# Patient Record
Sex: FEMALE | Race: WHITE | NOT HISPANIC OR LATINO | Employment: FULL TIME | URBAN - METROPOLITAN AREA
[De-identification: names, ages, dates, MRNs, and addresses within clinical notes are randomized per-mention and may not be internally consistent; named-entity substitution may affect disease eponyms.]

---

## 2023-08-11 ENCOUNTER — HOSPITAL ENCOUNTER (OUTPATIENT)
Dept: DATA CONVERSION | Facility: HOSPITAL | Age: 33
Discharge: HOME | End: 2023-08-11

## 2023-08-11 ENCOUNTER — HOSPITAL ENCOUNTER (OUTPATIENT)
Dept: DATA CONVERSION | Facility: HOSPITAL | Age: 33
End: 2023-08-11

## 2023-08-11 DIAGNOSIS — N64.4 MASTODYNIA: ICD-10-CM

## 2023-08-11 DIAGNOSIS — N64.89 OTHER SPECIFIED DISORDERS OF BREAST: ICD-10-CM

## 2023-11-15 ASSESSMENT — ENCOUNTER SYMPTOMS
CONSTITUTIONAL NEGATIVE: 1
CONSTIPATION: 0
DIARRHEA: 0
VOMITING: 0
NAUSEA: 0

## 2023-11-15 NOTE — PROGRESS NOTES
Subjective   Patient ID: Beatriz Lopez is a 32 y.o. female who presents for No chief complaint on file..    HPI pt comes in today c.o of pain under her right rib cage for the past few months.  Just recently saw her gyn for right breast pain for a year with neg mammo and US    Review of Systems   Constitutional: Negative.    Gastrointestinal:  Negative for constipation, diarrhea, nausea and vomiting.   Musculoskeletal:         See hpi       Objective   There were no vitals taken for this visit.    Physical Exam    Assessment/Plan   {Assess/PlanSmartLinks:18138}

## 2023-11-21 ENCOUNTER — APPOINTMENT (OUTPATIENT)
Dept: PRIMARY CARE | Facility: CLINIC | Age: 33
End: 2023-11-21
Payer: COMMERCIAL

## 2023-11-27 ENCOUNTER — OFFICE VISIT (OUTPATIENT)
Dept: SURGERY | Facility: CLINIC | Age: 33
End: 2023-11-27
Payer: COMMERCIAL

## 2023-11-27 VITALS
RESPIRATION RATE: 17 BRPM | BODY MASS INDEX: 30.3 KG/M2 | HEIGHT: 63 IN | TEMPERATURE: 96.8 F | DIASTOLIC BLOOD PRESSURE: 78 MMHG | WEIGHT: 171 LBS | HEART RATE: 76 BPM | SYSTOLIC BLOOD PRESSURE: 118 MMHG

## 2023-11-27 DIAGNOSIS — K82.4 GALLBLADDER POLYP: Primary | ICD-10-CM

## 2023-11-27 PROCEDURE — 99212 OFFICE O/P EST SF 10 MIN: CPT | Performed by: SURGERY

## 2023-11-27 PROCEDURE — 99202 OFFICE O/P NEW SF 15 MIN: CPT | Performed by: SURGERY

## 2023-11-27 PROCEDURE — 1036F TOBACCO NON-USER: CPT | Performed by: SURGERY

## 2023-11-27 RX ORDER — CEFAZOLIN SODIUM 2 G/100ML
2 INJECTION, SOLUTION INTRAVENOUS ONCE
Status: CANCELLED | OUTPATIENT
Start: 2023-12-19 | End: 2023-11-27

## 2023-11-27 RX ORDER — SODIUM CHLORIDE, SODIUM LACTATE, POTASSIUM CHLORIDE, CALCIUM CHLORIDE 600; 310; 30; 20 MG/100ML; MG/100ML; MG/100ML; MG/100ML
100 INJECTION, SOLUTION INTRAVENOUS CONTINUOUS
Status: CANCELLED | OUTPATIENT
Start: 2023-12-19

## 2023-11-27 RX ORDER — LINACLOTIDE 72 UG/1
72 CAPSULE, GELATIN COATED ORAL
COMMUNITY

## 2023-11-27 RX ORDER — CELECOXIB 50 MG/1
400 CAPSULE ORAL ONCE
Status: CANCELLED | OUTPATIENT
Start: 2023-11-27 | End: 2023-11-27

## 2023-11-27 RX ORDER — FAMOTIDINE 10 MG/1
10 TABLET ORAL DAILY PRN
COMMUNITY

## 2023-11-27 RX ORDER — ACETAMINOPHEN 325 MG/1
975 TABLET ORAL ONCE
Status: CANCELLED | OUTPATIENT
Start: 2023-11-27 | End: 2023-11-27

## 2023-11-27 ASSESSMENT — PAIN SCALES - GENERAL: PAINLEVEL: 3

## 2023-11-27 ASSESSMENT — PATIENT HEALTH QUESTIONNAIRE - PHQ9
1. LITTLE INTEREST OR PLEASURE IN DOING THINGS: NOT AT ALL
SUM OF ALL RESPONSES TO PHQ9 QUESTIONS 1 & 2: 0
2. FEELING DOWN, DEPRESSED OR HOPELESS: NOT AT ALL

## 2023-11-27 ASSESSMENT — ENCOUNTER SYMPTOMS
ALLERGIC/IMMUNOLOGIC NEGATIVE: 1
CARDIOVASCULAR NEGATIVE: 1
OCCASIONAL FEELINGS OF UNSTEADINESS: 0
ENDOCRINE NEGATIVE: 1
ABDOMINAL PAIN: 1
NAUSEA: 1
EYES NEGATIVE: 1
RESPIRATORY NEGATIVE: 1
LOSS OF SENSATION IN FEET: 0
CONSTITUTIONAL NEGATIVE: 1
NEUROLOGICAL NEGATIVE: 1
HEMATOLOGIC/LYMPHATIC NEGATIVE: 1
MUSCULOSKELETAL NEGATIVE: 1
ABDOMINAL DISTENTION: 1
PSYCHIATRIC NEGATIVE: 1
DEPRESSION: 0

## 2023-11-27 ASSESSMENT — LIFESTYLE VARIABLES
AUDIT-C TOTAL SCORE: 1
SKIP TO QUESTIONS 9-10: 1
HOW MANY STANDARD DRINKS CONTAINING ALCOHOL DO YOU HAVE ON A TYPICAL DAY: 1 OR 2
HOW OFTEN DO YOU HAVE SIX OR MORE DRINKS ON ONE OCCASION: NEVER
HOW OFTEN DO YOU HAVE A DRINK CONTAINING ALCOHOL: MONTHLY OR LESS

## 2023-11-27 NOTE — H&P (VIEW-ONLY)
General Surgery Service    History Of Present Illness    Beatriz Lopez is a 33 y.o. female presenting with gallbladder polyps (3mm and 2 mm). Pain in epigastric region and right upper quadrant pain 30 minutes after eating. This has been worsening over the past 2 weeks. The patient also reports an associated nausea/vomiting with the symptoms.     She has attempted to change her diet to limit fatty foods without improvement in the symptoms.     No cardiac or pulmonary issues. No blood thinners/ anti-platelet agent use.     Past Medical History  She has a past medical history of Antiphospholipid antibody with hypercoagulable state (CMS/[facility]), BPPV (benign paroxysmal positional vertigo), History of COVID-19 (2022), History of depression, History of HPV infection, Migraine, Protein S deficiency (CMS/[facility]), Seasonal allergies, and Vitamin D deficiency.    No current outpatient medications on file prior to visit.     No current facility-administered medications on file prior to visit.         Surgical History  She has a past surgical history that includes MR angio head wo IV contrast (2021); MR angio neck wo IV contrast (2021); Tonsillectomy; and  section, classic.     Social History  She has no history on file for tobacco use, alcohol use, and drug use.    Family History  No family history on file.     Allergies  Patient has no allergy information on record.    Review of Systems   Constitutional: Negative.    HENT: Negative.     Eyes: Negative.    Respiratory: Negative.     Cardiovascular: Negative.    Gastrointestinal:  Positive for abdominal distention, abdominal pain and nausea.   Endocrine: Negative.    Genitourinary: Negative.    Musculoskeletal: Negative.    Skin: Negative.    Allergic/Immunologic: Negative.    Neurological: Negative.    Hematological: Negative.    Psychiatric/Behavioral: Negative.          Physical Exam  Constitutional:       Appearance: Normal appearance.   HENT:       "Head: Normocephalic.      Nose: Nose normal.      Mouth/Throat:      Mouth: Mucous membranes are dry.   Eyes:      Extraocular Movements: Extraocular movements intact.      Pupils: Pupils are equal, round, and reactive to light.   Cardiovascular:      Rate and Rhythm: Normal rate and regular rhythm.   Pulmonary:      Breath sounds: Normal breath sounds.   Abdominal:      General: Abdomen is flat.      Palpations: Abdomen is soft. There is mass.      Tenderness: There is abdominal tenderness.      Comments: TTP in the epigastric and RUQ   Musculoskeletal:         General: Normal range of motion.      Cervical back: Normal range of motion.   Skin:     General: Skin is warm and dry.   Neurological:      Mental Status: She is alert and oriented to person, place, and time.   Psychiatric:         Mood and Affect: Mood normal.         Behavior: Behavior normal.          Last Recorded Vitals  There were no vitals taken for this visit.    Relevant Results      No results found for this or any previous visit (from the past 24 hour(s)).   No results found for: \"HGBA1C\"   US ABD RIGHT UPPER QUADRANT    Result Date: 11/17/2023  * * *Final Report* * * DATE OF EXAM: Nov 17 2023 12:22PM   [facility] [place]   [address]  -  US ABD RIGHT UPPER QUADRANT  / ACCESSION #  [id number] PROCEDURE REASON: Other (enter below)      * * * * Physician Interpretation * * * *  EXAMINATION:   RIGHT UPPER QUADRANT ULTRASOUND CLINICAL HISTORY: RUQ pain TECHNIQUE:  Sonography of the right upper quadrant  was performed.   Images were obtained and stored in a permanent archive. MQ:  URUQ_2 COMPARISON: None. RESULT: Pancreas: Normal sonographic appearance.    Portions obscured: tail Liver:      Echotexture:  Normal, homogeneous.      Echogenicity:  Normal      Surface contour:  Smooth      Lesions:  None. Biliary: No intrahepatic biliary duct dilation.      CBD: 0.3 cm at the hilum.      Gallbladder: Normal caliber           -Contents:  No cholelithiasis. 3 mm and 2 mm " gallbladder wall polyps.           -Wall:  Normal           -Other:  No pericholecystic fluid.  Negative sonographic Mcmanus's sign. Right Kidney: No hydronephrosis. Ascites: None.    IMPRESSION: No sonographic evidence of acute cholecystitis. Normal sonographic appearance of the liver. A couple subcentimeter gallbladder polyps. : SABRINA   Transcribe Date/Time: Nov 17 2023 12:27P Dictated by : SURINDER ALCANTARA MD This examination was interpreted and the report reviewed and electronically signed by: SURINDER ALCANTARA MD on Nov 17 2023 12:34PM  EST      Active Problems:  There are no active Hospital Problems.     Assessment/Plan   Diagnoses and all orders for this visit:  Gallbladder polyp    Plan for laparoscopic cholecystectomy. Discussed risks including the risk of bleeding, infection, injury to surrounding structures, chronic diarrhea, bile leak. Patient provides consent.     Also discussed that GERD may improve or worsen or stay the same following surgery.         Laurence Man MD

## 2023-11-27 NOTE — PROGRESS NOTES
General Surgery Service    History Of Present Illness    Beatriz Lopez is a 33 y.o. female presenting with gallbladder polyps (3mm and 2 mm). Pain in epigastric region and right upper quadrant pain 30 minutes after eating. This has been worsening over the past 2 weeks. The patient also reports an associated nausea/vomiting with the symptoms.     She has attempted to change her diet to limit fatty foods without improvement in the symptoms.     No cardiac or pulmonary issues. No blood thinners/ anti-platelet agent use.     Past Medical History  She has a past medical history of Antiphospholipid antibody with hypercoagulable state (CMS/[facility]), BPPV (benign paroxysmal positional vertigo), History of COVID-19 (2022), History of depression, History of HPV infection, Migraine, Protein S deficiency (CMS/[facility]), Seasonal allergies, and Vitamin D deficiency.    No current outpatient medications on file prior to visit.     No current facility-administered medications on file prior to visit.         Surgical History  She has a past surgical history that includes MR angio head wo IV contrast (2021); MR angio neck wo IV contrast (2021); Tonsillectomy; and  section, classic.     Social History  She has no history on file for tobacco use, alcohol use, and drug use.    Family History  No family history on file.     Allergies  Patient has no allergy information on record.    Review of Systems   Constitutional: Negative.    HENT: Negative.     Eyes: Negative.    Respiratory: Negative.     Cardiovascular: Negative.    Gastrointestinal:  Positive for abdominal distention, abdominal pain and nausea.   Endocrine: Negative.    Genitourinary: Negative.    Musculoskeletal: Negative.    Skin: Negative.    Allergic/Immunologic: Negative.    Neurological: Negative.    Hematological: Negative.    Psychiatric/Behavioral: Negative.          Physical Exam  Constitutional:       Appearance: Normal appearance.   HENT:       "Head: Normocephalic.      Nose: Nose normal.      Mouth/Throat:      Mouth: Mucous membranes are dry.   Eyes:      Extraocular Movements: Extraocular movements intact.      Pupils: Pupils are equal, round, and reactive to light.   Cardiovascular:      Rate and Rhythm: Normal rate and regular rhythm.   Pulmonary:      Breath sounds: Normal breath sounds.   Abdominal:      General: Abdomen is flat.      Palpations: Abdomen is soft. There is mass.      Tenderness: There is abdominal tenderness.      Comments: TTP in the epigastric and RUQ   Musculoskeletal:         General: Normal range of motion.      Cervical back: Normal range of motion.   Skin:     General: Skin is warm and dry.   Neurological:      Mental Status: She is alert and oriented to person, place, and time.   Psychiatric:         Mood and Affect: Mood normal.         Behavior: Behavior normal.          Last Recorded Vitals  There were no vitals taken for this visit.    Relevant Results      No results found for this or any previous visit (from the past 24 hour(s)).   No results found for: \"HGBA1C\"   US ABD RIGHT UPPER QUADRANT    Result Date: 11/17/2023  * * *Final Report* * * DATE OF EXAM: Nov 17 2023 12:22PM   [facility] [place]   1032  -  US ABD RIGHT UPPER QUADRANT  / ACCESSION #  [id number] PROCEDURE REASON: Other (enter below)      * * * * Physician Interpretation * * * *  EXAMINATION:   RIGHT UPPER QUADRANT ULTRASOUND CLINICAL HISTORY: RUQ pain TECHNIQUE:  Sonography of the right upper quadrant  was performed.   Images were obtained and stored in a permanent archive. MQ:  URUQ_2 COMPARISON: None. RESULT: Pancreas: Normal sonographic appearance.    Portions obscured: tail Liver:      Echotexture:  Normal, homogeneous.      Echogenicity:  Normal      Surface contour:  Smooth      Lesions:  None. Biliary: No intrahepatic biliary duct dilation.      CBD: 0.3 cm at the hilum.      Gallbladder: Normal caliber           -Contents:  No cholelithiasis. 3 mm and 2 mm " gallbladder wall polyps.           -Wall:  Normal           -Other:  No pericholecystic fluid.  Negative sonographic Mcmanus's sign. Right Kidney: No hydronephrosis. Ascites: None.    IMPRESSION: No sonographic evidence of acute cholecystitis. Normal sonographic appearance of the liver. A couple subcentimeter gallbladder polyps. : SABRINA   Transcribe Date/Time: Nov 17 2023 12:27P Dictated by : SURINDER ALCANTARA MD This examination was interpreted and the report reviewed and electronically signed by: SURINDER ALCANTARA MD on Nov 17 2023 12:34PM  EST      Active Problems:  There are no active Hospital Problems.     Assessment/Plan   Diagnoses and all orders for this visit:  Gallbladder polyp    Plan for laparoscopic cholecystectomy. Discussed risks including the risk of bleeding, infection, injury to surrounding structures, chronic diarrhea, bile leak. Patient provides consent.     Also discussed that GERD may improve or worsen or stay the same following surgery.         Laurence Man MD

## 2023-11-30 ENCOUNTER — TELEPHONE (OUTPATIENT)
Dept: SURGERY | Facility: CLINIC | Age: 33
End: 2023-11-30
Payer: COMMERCIAL

## 2023-12-18 ENCOUNTER — ANESTHESIA EVENT (OUTPATIENT)
Dept: OPERATING ROOM | Facility: HOSPITAL | Age: 33
End: 2023-12-18
Payer: COMMERCIAL

## 2023-12-18 SDOH — HEALTH STABILITY: MENTAL HEALTH: CURRENT SMOKER: 0

## 2023-12-19 ENCOUNTER — PHARMACY VISIT (OUTPATIENT)
Dept: PHARMACY | Facility: CLINIC | Age: 33
End: 2023-12-19
Payer: MEDICARE

## 2023-12-19 ENCOUNTER — ANESTHESIA (OUTPATIENT)
Dept: OPERATING ROOM | Facility: HOSPITAL | Age: 33
End: 2023-12-19
Payer: COMMERCIAL

## 2023-12-19 ENCOUNTER — HOSPITAL ENCOUNTER (OUTPATIENT)
Facility: HOSPITAL | Age: 33
Setting detail: OUTPATIENT SURGERY
Discharge: HOME | End: 2023-12-19
Attending: SURGERY | Admitting: SURGERY
Payer: COMMERCIAL

## 2023-12-19 VITALS
SYSTOLIC BLOOD PRESSURE: 111 MMHG | RESPIRATION RATE: 17 BRPM | DIASTOLIC BLOOD PRESSURE: 62 MMHG | HEIGHT: 63 IN | TEMPERATURE: 97.2 F | WEIGHT: 170 LBS | BODY MASS INDEX: 30.12 KG/M2 | HEART RATE: 64 BPM | OXYGEN SATURATION: 100 %

## 2023-12-19 DIAGNOSIS — K82.4 GALLBLADDER POLYP: Primary | ICD-10-CM

## 2023-12-19 PROBLEM — R42 LIGHTHEADEDNESS: Status: ACTIVE | Noted: 2023-12-19

## 2023-12-19 PROBLEM — U07.1 COVID-19: Status: ACTIVE | Noted: 2023-12-19

## 2023-12-19 PROBLEM — O09.299 PREGNANCY WITH POOR OBSTETRIC HISTORY (HHS-HCC): Status: ACTIVE | Noted: 2023-12-19

## 2023-12-19 PROBLEM — J30.2 SEASONAL ALLERGIES: Status: ACTIVE | Noted: 2023-12-19

## 2023-12-19 PROBLEM — R21 RASH: Status: ACTIVE | Noted: 2023-12-19

## 2023-12-19 PROBLEM — F41.9 ANXIETY: Status: ACTIVE | Noted: 2023-12-19

## 2023-12-19 PROBLEM — R07.89 CHEST PAIN, ATYPICAL: Status: ACTIVE | Noted: 2023-12-19

## 2023-12-19 PROBLEM — O21.9 VOMITING OF PREGNANCY (HHS-HCC): Status: ACTIVE | Noted: 2023-12-19

## 2023-12-19 PROBLEM — R51.9 HEADACHE: Status: ACTIVE | Noted: 2023-12-19

## 2023-12-19 PROBLEM — M54.2 NECK PAIN: Status: ACTIVE | Noted: 2023-12-19

## 2023-12-19 PROBLEM — R55 NEAR SYNCOPE: Status: ACTIVE | Noted: 2023-12-19

## 2023-12-19 PROBLEM — F43.10 PTSD (POST-TRAUMATIC STRESS DISORDER): Status: ACTIVE | Noted: 2023-12-19

## 2023-12-19 PROBLEM — R87.811 VAGINAL HIGH RISK HUMAN PAPILLOMAVIRUS (HPV) DNA TEST POSITIVE: Status: ACTIVE | Noted: 2023-12-19
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
## 2023-12-19 PROBLEM — R10.9 ABDOMINAL PAIN: Status: ACTIVE | Noted: 2023-12-19

## 2023-12-19 PROBLEM — E55.9 VITAMIN D DEFICIENCY: Status: ACTIVE | Noted: 2023-12-19

## 2023-12-19 PROBLEM — D68.59 THROMBOPHILIA (MULTI): Status: ACTIVE | Noted: 2023-12-19

## 2023-12-19 PROBLEM — H93.293 ABNORMAL AUDITORY PERCEPTION OF BOTH EARS: Status: ACTIVE | Noted: 2023-12-19

## 2023-12-19 PROBLEM — F32.A DEPRESSION: Status: ACTIVE | Noted: 2023-12-19

## 2023-12-19 PROBLEM — R10.9 STOMACH CRAMPS: Status: ACTIVE | Noted: 2023-12-19

## 2023-12-19 PROBLEM — R42 DIZZINESS: Status: ACTIVE | Noted: 2023-12-19

## 2023-12-19 PROBLEM — Z98.891 HISTORY OF CESAREAN SECTION: Status: ACTIVE | Noted: 2023-12-19

## 2023-12-19 PROBLEM — N93.9 ABNORMAL VAGINAL BLEEDING: Status: ACTIVE | Noted: 2023-12-19

## 2023-12-19 PROBLEM — F41.8 MIXED ANXIETY AND DEPRESSIVE DISORDER: Status: ACTIVE | Noted: 2023-12-19

## 2023-12-19 PROBLEM — H81.12 BENIGN PAROXYSMAL POSITIONAL VERTIGO OF LEFT EAR: Status: ACTIVE | Noted: 2023-12-19

## 2023-12-19 PROBLEM — G43.909 MIGRAINE: Status: ACTIVE | Noted: 2023-12-19

## 2023-12-19 PROBLEM — N92.6 MENSTRUAL DISORDER: Status: ACTIVE | Noted: 2023-12-19

## 2023-12-19 PROBLEM — N64.89 FULLNESS OF BREAST: Status: ACTIVE | Noted: 2023-12-19

## 2023-12-19 PROBLEM — D68.59 PROTEIN S DEFICIENCY (MULTI): Status: ACTIVE | Noted: 2023-12-19

## 2023-12-19 PROBLEM — L23.7 CONTACT DERMATITIS DUE TO POISON IVY: Status: ACTIVE | Noted: 2023-12-19

## 2023-12-19 PROBLEM — D68.61 ANTIPHOSPHOLIPID SYNDROME (MULTI): Status: ACTIVE | Noted: 2023-12-19

## 2023-12-19 PROBLEM — R11.0 NAUSEA IN ADULT: Status: ACTIVE | Noted: 2023-12-19

## 2023-12-19 LAB — PREGNANCY TEST URINE, POC: NEGATIVE

## 2023-12-19 PROCEDURE — 7100000009 HC PHASE TWO TIME - INITIAL BASE CHARGE: Performed by: SURGERY

## 2023-12-19 PROCEDURE — 47562 LAPAROSCOPIC CHOLECYSTECTOMY: CPT | Performed by: SURGERY

## 2023-12-19 PROCEDURE — 88304 TISSUE EXAM BY PATHOLOGIST: CPT | Performed by: PATHOLOGY

## 2023-12-19 PROCEDURE — 3600000009 HC OR TIME - EACH INCREMENTAL 1 MINUTE - PROCEDURE LEVEL FOUR: Performed by: SURGERY

## 2023-12-19 PROCEDURE — 88304 TISSUE EXAM BY PATHOLOGIST: CPT | Mod: TC | Performed by: SURGERY

## 2023-12-19 PROCEDURE — 2780000003 HC OR 278 NO HCPCS: Performed by: SURGERY

## 2023-12-19 PROCEDURE — 3700000002 HC GENERAL ANESTHESIA TIME - EACH INCREMENTAL 1 MINUTE: Performed by: SURGERY

## 2023-12-19 PROCEDURE — 3700000001 HC GENERAL ANESTHESIA TIME - INITIAL BASE CHARGE: Performed by: SURGERY

## 2023-12-19 PROCEDURE — A4550 SURGICAL TRAYS: HCPCS | Performed by: SURGERY

## 2023-12-19 PROCEDURE — 2500000001 HC RX 250 WO HCPCS SELF ADMINISTERED DRUGS (ALT 637 FOR MEDICARE OP): Performed by: SURGERY

## 2023-12-19 PROCEDURE — 7100000002 HC RECOVERY ROOM TIME - EACH INCREMENTAL 1 MINUTE: Performed by: SURGERY

## 2023-12-19 PROCEDURE — 2500000004 HC RX 250 GENERAL PHARMACY W/ HCPCS (ALT 636 FOR OP/ED): Performed by: ANESTHESIOLOGY

## 2023-12-19 PROCEDURE — 94760 N-INVAS EAR/PLS OXIMETRY 1: CPT

## 2023-12-19 PROCEDURE — 2720000007 HC OR 272 NO HCPCS: Performed by: SURGERY

## 2023-12-19 PROCEDURE — 7100000001 HC RECOVERY ROOM TIME - INITIAL BASE CHARGE: Performed by: SURGERY

## 2023-12-19 PROCEDURE — 2500000004 HC RX 250 GENERAL PHARMACY W/ HCPCS (ALT 636 FOR OP/ED): Performed by: SURGERY

## 2023-12-19 PROCEDURE — 2500000005 HC RX 250 GENERAL PHARMACY W/O HCPCS: Performed by: ANESTHESIOLOGY

## 2023-12-19 PROCEDURE — 7100000010 HC PHASE TWO TIME - EACH INCREMENTAL 1 MINUTE: Performed by: SURGERY

## 2023-12-19 PROCEDURE — 3600000004 HC OR TIME - INITIAL BASE CHARGE - PROCEDURE LEVEL FOUR: Performed by: SURGERY

## 2023-12-19 PROCEDURE — 2500000005 HC RX 250 GENERAL PHARMACY W/O HCPCS: Performed by: SURGERY

## 2023-12-19 PROCEDURE — 81025 URINE PREGNANCY TEST: CPT | Performed by: ANESTHESIOLOGY

## 2023-12-19 PROCEDURE — RXMED WILLOW AMBULATORY MEDICATION CHARGE

## 2023-12-19 RX ORDER — FERROUS SULFATE, DRIED 160(50) MG
1 TABLET, EXTENDED RELEASE ORAL DAILY
COMMUNITY

## 2023-12-19 RX ORDER — FENTANYL CITRATE 50 UG/ML
50 INJECTION, SOLUTION INTRAMUSCULAR; INTRAVENOUS EVERY 5 MIN PRN
Status: DISCONTINUED | OUTPATIENT
Start: 2023-12-19 | End: 2023-12-19 | Stop reason: HOSPADM

## 2023-12-19 RX ORDER — FERROUS SULFATE 325(65) MG
65 TABLET, DELAYED RELEASE (ENTERIC COATED) ORAL
COMMUNITY

## 2023-12-19 RX ORDER — MORPHINE SULFATE 2 MG/ML
2 INJECTION, SOLUTION INTRAMUSCULAR; INTRAVENOUS EVERY 5 MIN PRN
Status: DISCONTINUED | OUTPATIENT
Start: 2023-12-19 | End: 2023-12-19 | Stop reason: HOSPADM

## 2023-12-19 RX ORDER — FENTANYL CITRATE 50 UG/ML
INJECTION, SOLUTION INTRAMUSCULAR; INTRAVENOUS AS NEEDED
Status: DISCONTINUED | OUTPATIENT
Start: 2023-12-19 | End: 2023-12-19

## 2023-12-19 RX ORDER — CHOLECALCIFEROL (VITAMIN D3) 25 MCG
TABLET,CHEWABLE ORAL DAILY
COMMUNITY
End: 2024-02-20 | Stop reason: WASHOUT

## 2023-12-19 RX ORDER — MIDAZOLAM HYDROCHLORIDE 1 MG/ML
INJECTION, SOLUTION INTRAMUSCULAR; INTRAVENOUS AS NEEDED
Status: DISCONTINUED | OUTPATIENT
Start: 2023-12-19 | End: 2023-12-19

## 2023-12-19 RX ORDER — ACETAMINOPHEN 500 MG
1000 TABLET ORAL EVERY 6 HOURS PRN
Qty: 20 TABLET | Refills: 0 | Status: SHIPPED | OUTPATIENT
Start: 2023-12-19

## 2023-12-19 RX ORDER — PROPOFOL 10 MG/ML
INJECTION, EMULSION INTRAVENOUS AS NEEDED
Status: DISCONTINUED | OUTPATIENT
Start: 2023-12-19 | End: 2023-12-19

## 2023-12-19 RX ORDER — ONDANSETRON HYDROCHLORIDE 2 MG/ML
INJECTION, SOLUTION INTRAVENOUS AS NEEDED
Status: DISCONTINUED | OUTPATIENT
Start: 2023-12-19 | End: 2023-12-19

## 2023-12-19 RX ORDER — ACETAMINOPHEN 325 MG/1
975 TABLET ORAL ONCE
Status: COMPLETED | OUTPATIENT
Start: 2023-12-19 | End: 2023-12-19

## 2023-12-19 RX ORDER — NEOSTIGMINE METHYLSULFATE 0.5 MG/ML
INJECTION, SOLUTION INTRAVENOUS AS NEEDED
Status: DISCONTINUED | OUTPATIENT
Start: 2023-12-19 | End: 2023-12-19

## 2023-12-19 RX ORDER — TIZANIDINE 4 MG/1
4 TABLET ORAL 3 TIMES DAILY
Qty: 12 TABLET | Refills: 0 | Status: SHIPPED | OUTPATIENT
Start: 2023-12-19 | End: 2024-02-20 | Stop reason: WASHOUT

## 2023-12-19 RX ORDER — ROCURONIUM BROMIDE 10 MG/ML
INJECTION, SOLUTION INTRAVENOUS AS NEEDED
Status: DISCONTINUED | OUTPATIENT
Start: 2023-12-19 | End: 2023-12-19

## 2023-12-19 RX ORDER — GLYCOPYRROLATE 0.2 MG/ML
INJECTION INTRAMUSCULAR; INTRAVENOUS AS NEEDED
Status: DISCONTINUED | OUTPATIENT
Start: 2023-12-19 | End: 2023-12-19

## 2023-12-19 RX ORDER — DOCUSATE SODIUM 100 MG/1
100 CAPSULE, LIQUID FILLED ORAL DAILY
Qty: 5 CAPSULE | Refills: 0 | Status: SHIPPED | OUTPATIENT
Start: 2023-12-19 | End: 2024-02-20 | Stop reason: WASHOUT

## 2023-12-19 RX ORDER — LIDOCAINE HYDROCHLORIDE AND EPINEPHRINE 10; 10 MG/ML; UG/ML
INJECTION, SOLUTION INFILTRATION; PERINEURAL AS NEEDED
Status: DISCONTINUED | OUTPATIENT
Start: 2023-12-19 | End: 2023-12-19 | Stop reason: HOSPADM

## 2023-12-19 RX ORDER — CEFAZOLIN SODIUM 2 G/100ML
2 INJECTION, SOLUTION INTRAVENOUS ONCE
Status: COMPLETED | OUTPATIENT
Start: 2023-12-19 | End: 2023-12-19

## 2023-12-19 RX ORDER — FLUCONAZOLE 150 MG/1
150 TABLET ORAL ONCE
Qty: 1 TABLET | Refills: 0 | Status: SHIPPED | OUTPATIENT
Start: 2023-12-19 | End: 2023-12-20

## 2023-12-19 RX ORDER — ONDANSETRON HYDROCHLORIDE 2 MG/ML
4 INJECTION, SOLUTION INTRAVENOUS ONCE AS NEEDED
Status: DISCONTINUED | OUTPATIENT
Start: 2023-12-19 | End: 2023-12-19 | Stop reason: HOSPADM

## 2023-12-19 RX ORDER — CELECOXIB 200 MG/1
400 CAPSULE ORAL ONCE
Status: COMPLETED | OUTPATIENT
Start: 2023-12-19 | End: 2023-12-19

## 2023-12-19 RX ORDER — ONDANSETRON HYDROCHLORIDE 2 MG/ML
4 INJECTION, SOLUTION INTRAVENOUS ONCE
Status: COMPLETED | OUTPATIENT
Start: 2023-12-19 | End: 2023-12-19

## 2023-12-19 RX ORDER — ALBUTEROL SULFATE 0.83 MG/ML
2.5 SOLUTION RESPIRATORY (INHALATION) ONCE AS NEEDED
Status: DISCONTINUED | OUTPATIENT
Start: 2023-12-19 | End: 2023-12-19 | Stop reason: HOSPADM

## 2023-12-19 RX ORDER — SODIUM CHLORIDE, SODIUM LACTATE, POTASSIUM CHLORIDE, CALCIUM CHLORIDE 600; 310; 30; 20 MG/100ML; MG/100ML; MG/100ML; MG/100ML
100 INJECTION, SOLUTION INTRAVENOUS CONTINUOUS
Status: DISCONTINUED | OUTPATIENT
Start: 2023-12-19 | End: 2023-12-19 | Stop reason: HOSPADM

## 2023-12-19 RX ORDER — OXYCODONE HYDROCHLORIDE 5 MG/1
5 TABLET ORAL EVERY 6 HOURS PRN
Qty: 10 TABLET | Refills: 0 | Status: SHIPPED | OUTPATIENT
Start: 2023-12-19 | End: 2024-02-20 | Stop reason: WASHOUT

## 2023-12-19 RX ORDER — DEXAMETHASONE SODIUM PHOSPHATE 4 MG/ML
INJECTION, SOLUTION INTRA-ARTICULAR; INTRALESIONAL; INTRAMUSCULAR; INTRAVENOUS; SOFT TISSUE AS NEEDED
Status: DISCONTINUED | OUTPATIENT
Start: 2023-12-19 | End: 2023-12-19

## 2023-12-19 RX ADMIN — MORPHINE SULFATE 2 MG: 2 INJECTION, SOLUTION INTRAMUSCULAR; INTRAVENOUS at 10:05

## 2023-12-19 RX ADMIN — PROPOFOL 180 MG: 10 INJECTION, EMULSION INTRAVENOUS at 08:00

## 2023-12-19 RX ADMIN — FENTANYL CITRATE 50 MCG: 50 INJECTION INTRAMUSCULAR; INTRAVENOUS at 09:24

## 2023-12-19 RX ADMIN — ACETAMINOPHEN 975 MG: 325 TABLET ORAL at 06:54

## 2023-12-19 RX ADMIN — DEXAMETHASONE SODIUM PHOSPHATE 4 MG: 4 INJECTION, SOLUTION INTRA-ARTICULAR; INTRALESIONAL; INTRAMUSCULAR; INTRAVENOUS; SOFT TISSUE at 09:01

## 2023-12-19 RX ADMIN — ONDANSETRON HYDROCHLORIDE 4 MG: 2 INJECTION INTRAMUSCULAR; INTRAVENOUS at 09:03

## 2023-12-19 RX ADMIN — ONDANSETRON 4 MG: 2 INJECTION INTRAMUSCULAR; INTRAVENOUS at 11:30

## 2023-12-19 RX ADMIN — GLYCOPYRROLATE 0.6 MG: 0.2 INJECTION INTRAMUSCULAR; INTRAVENOUS at 08:59

## 2023-12-19 RX ADMIN — FENTANYL CITRATE 50 MCG: 0.05 INJECTION, SOLUTION INTRAMUSCULAR; INTRAVENOUS at 08:01

## 2023-12-19 RX ADMIN — MIDAZOLAM HYDROCHLORIDE 2 MG: 1 INJECTION, SOLUTION INTRAMUSCULAR; INTRAVENOUS at 07:53

## 2023-12-19 RX ADMIN — CELECOXIB 400 MG: 200 CAPSULE ORAL at 06:54

## 2023-12-19 RX ADMIN — FENTANYL CITRATE 50 MCG: 0.05 INJECTION, SOLUTION INTRAMUSCULAR; INTRAVENOUS at 07:53

## 2023-12-19 RX ADMIN — FENTANYL CITRATE 50 MCG: 50 INJECTION INTRAMUSCULAR; INTRAVENOUS at 09:38

## 2023-12-19 RX ADMIN — FENTANYL CITRATE 50 MCG: 50 INJECTION INTRAMUSCULAR; INTRAVENOUS at 09:16

## 2023-12-19 RX ADMIN — SODIUM CHLORIDE, POTASSIUM CHLORIDE, SODIUM LACTATE AND CALCIUM CHLORIDE: 600; 310; 30; 20 INJECTION, SOLUTION INTRAVENOUS at 06:20

## 2023-12-19 RX ADMIN — FENTANYL CITRATE 50 MCG: 50 INJECTION INTRAMUSCULAR; INTRAVENOUS at 09:31

## 2023-12-19 RX ADMIN — CEFAZOLIN SODIUM 2 G: 2 INJECTION, SOLUTION INTRAVENOUS at 07:56

## 2023-12-19 RX ADMIN — MORPHINE SULFATE 2 MG: 2 INJECTION, SOLUTION INTRAMUSCULAR; INTRAVENOUS at 09:48

## 2023-12-19 RX ADMIN — NEOSTIGMINE METHYLSULFATE 5 MG: 0.5 INJECTION, SOLUTION INTRAVENOUS at 09:03

## 2023-12-19 RX ADMIN — ROCURONIUM BROMIDE 50 MG: 10 INJECTION, SOLUTION INTRAVENOUS at 08:01

## 2023-12-19 ASSESSMENT — PAIN - FUNCTIONAL ASSESSMENT
PAIN_FUNCTIONAL_ASSESSMENT: 0-10

## 2023-12-19 ASSESSMENT — PAIN SCALES - GENERAL
PAIN_LEVEL: 5
PAINLEVEL_OUTOF10: 10 - WORST POSSIBLE PAIN
PAINLEVEL_OUTOF10: 5 - MODERATE PAIN
PAINLEVEL_OUTOF10: 10 - WORST POSSIBLE PAIN
PAINLEVEL_OUTOF10: 10 - WORST POSSIBLE PAIN
PAINLEVEL_OUTOF10: 7
PAINLEVEL_OUTOF10: 4
PAINLEVEL_OUTOF10: 2
PAINLEVEL_OUTOF10: 6
PAINLEVEL_OUTOF10: 6
PAINLEVEL_OUTOF10: 8
PAINLEVEL_OUTOF10: 10 - WORST POSSIBLE PAIN

## 2023-12-19 ASSESSMENT — PAIN DESCRIPTION - LOCATION
LOCATION: ABDOMEN

## 2023-12-19 ASSESSMENT — ENCOUNTER SYMPTOMS
PSYCHIATRIC NEGATIVE: 1
GASTROINTESTINAL NEGATIVE: 1
HEMATOLOGIC/LYMPHATIC NEGATIVE: 1
CONSTITUTIONAL NEGATIVE: 1
CARDIOVASCULAR NEGATIVE: 1
EYES NEGATIVE: 1
NEUROLOGICAL NEGATIVE: 1
MUSCULOSKELETAL NEGATIVE: 1
RESPIRATORY NEGATIVE: 1

## 2023-12-19 NOTE — DISCHARGE INSTRUCTIONS
May remove gauze dressing tomorrow and shower  Leave steri strips to fall off on their own  Apply Band-aids to wounds if you have drainage    May apply ice to the wound for 15 minutes at a time, this can be done for the first 2 days after surgery    No lifting greater than 15 pounds for 4 weeks    Eat cracker, soup, light sandwich the night of surgery. May eat regular food the day after surgery.     May take stool softeners (colace daily for 10 days). If you still do not poop after 10 days take miralax or milk of magnesia until you poop.     Take tylenol and ibuprofen with oxycodone as needed for pain. Also there is zanaflex for pain. If oxycodone causes hives or nausea - please stop it.     Call the clinic within 4 days to make an appointment in 2 weeks

## 2023-12-19 NOTE — INTERVAL H&P NOTE
H&P reviewed. The patient was examined and there are no changes to the H&P.    Patient gallbladder polyps with symptoms, presents for lap debra

## 2023-12-19 NOTE — ANESTHESIA PROCEDURE NOTES
Airway  Date/Time: 12/19/2023 8:02 AM  Urgency: elective    Airway not difficult    Staffing  Performed: attending   Authorized by: Fuentes Zaragoza MD    Performed by: Fuentes Zaragoza MD  Patient location during procedure: OR    Indications and Patient Condition  Indications for airway management: anesthesia and airway protection  Spontaneous ventilation: present  Sedation level: deep  Preoxygenated: yes  Patient position: sniffing  MILS maintained throughout  Mask difficulty assessment: 1 - vent by mask  Planned trial extubation    Final Airway Details  Final airway type: endotracheal airway      Successful airway: ETT  Cuffed: yes   Successful intubation technique: video laryngoscopy  Endotracheal tube insertion site: oral  Blade: Neel  Blade size: #3  ETT size (mm): 7.5  Cormack-Lehane Classification: grade I - full view of glottis  Placement verified by: chest auscultation and capnometry   Cuff volume (mL): 8  Measured from: gums  ETT to gums (cm): 22  Number of attempts at approach: 1  Ventilation between attempts: none  Number of other approaches attempted: 0

## 2023-12-19 NOTE — POST-PROCEDURE NOTE
Pt had small emesis & feels better. Vitals stable, Pt wanting to get dressed. Instructed pt to go home & get some food in her stomach & then taking pain meds.

## 2023-12-19 NOTE — POST-PROCEDURE NOTE
Discharge instructions reviewed with pt & spouse, understanding verbalized. Pt still with nausea- Dr Mila glez aware & will order zofran.

## 2023-12-19 NOTE — H&P
History Of Present Illness  Beatriz Lopez is a 33 y.o. female presenting with gallbladder polyps. Has symptoms of epigastric discomfort.     No previous abdominal surgeries.     Past Medical History  Past Medical History:   Diagnosis Date    Antiphospholipid antibody with hypercoagulable state (CMS/HCC)     BPPV (benign paroxysmal positional vertigo)     Darrel    GERD (gastroesophageal reflux disease)     History of COVID-19 2022    History of depression     History of HPV infection     Migraine     Garcia    Protein S deficiency (CMS/HCC)     Seasonal allergies     Vitamin D deficiency        Surgical History  Past Surgical History:   Procedure Laterality Date     SECTION, CLASSIC       and     MR HEAD ANGIO WO IV CONTRAST  2021    MR HEAD ANGIO WO IV CONTRAST 2021 [facility] [place] [facility]    MR NECK ANGIO WO IV CONTRAST  2021    MR NECK ANGIO WO IV CONTRAST 2021 [facility] [place] [facility]    TONSILLECTOMY      when 9 yo        Social History  She reports that she has never smoked. She has never used smokeless tobacco. She reports current alcohol use. She reports that she does not use drugs.    Family History  Family History   Problem Relation Name Age of Onset    Brain Aneurysm Mother      Stroke Mother      Other (meningitis) Mother      Valvular heart disease Son      Gallbladder disease Maternal Grandmother          Allergies  Dilaudid [hydromorphone] and Sudafed [pseudoephedrine hcl]    Review of Systems   Constitutional: Negative.    HENT: Negative.     Eyes: Negative.    Respiratory: Negative.     Cardiovascular: Negative.    Gastrointestinal: Negative.    Genitourinary: Negative.    Musculoskeletal: Negative.    Skin: Negative.    Neurological: Negative.    Hematological: Negative.    Psychiatric/Behavioral: Negative.          Physical Exam  Constitutional:       Appearance: Normal appearance.   HENT:      Head: Normocephalic.      Nose: Nose normal.      Mouth/Throat:       "Mouth: Mucous membranes are dry.   Eyes:      Extraocular Movements: Extraocular movements intact.      Pupils: Pupils are equal, round, and reactive to light.   Cardiovascular:      Rate and Rhythm: Normal rate and regular rhythm.      Pulses: Normal pulses.      Heart sounds: Normal heart sounds.   Pulmonary:      Effort: Pulmonary effort is normal.      Breath sounds: Normal breath sounds.   Abdominal:      General: Abdomen is flat.      Palpations: Abdomen is soft.   Musculoskeletal:         General: Normal range of motion.      Cervical back: Normal range of motion.   Skin:     General: Skin is warm and dry.   Neurological:      General: No focal deficit present.      Mental Status: She is alert and oriented to person, place, and time.   Psychiatric:         Mood and Affect: Mood normal.         Behavior: Behavior normal.          Last Recorded Vitals  Blood pressure 113/67, pulse 79, temperature 36.6 °C (97.9 °F), temperature source Temporal, resp. rate 17, height 1.6 m (5' 3\"), weight 77.1 kg (170 lb), last menstrual period 12/03/2023, SpO2 99 %.    Relevant Results         Assessment/Plan   Principal Problem:    Gallbladder polyp      Plan for lap debra today, please see office note for previous discussion of operative plan       I spent 10 minutes in the professional and overall care of this patient.      Laurence Man MD    "

## 2023-12-19 NOTE — ANESTHESIA PREPROCEDURE EVALUATION
Patient: Beatriz Lopez    Procedure Information       Date/Time: 23    Procedure: Cholecystectomy Laparoscopy (Abdomen)    [facility]    Surgeons: Laurence Man MD     Past Medical History:   Diagnosis Date    Antiphospholipid antibody with hypercoagulable state (CMS/HCC)     BPPV (benign paroxysmal positional vertigo)     Darrel    History of COVID-19 2022    History of depression     History of HPV infection     Migraine     Garcia    Protein S deficiency (CMS/HCC)     Seasonal allergies     Vitamin D deficiency      Past Surgical History:   Procedure Laterality Date     SECTION, CLASSIC       and     MR HEAD ANGIO WO IV CONTRAST  2021    MR HEAD ANGIO WO IV CONTRAST 2021 [facility] [place] [facility]    MR NECK ANGIO WO IV CONTRAST  2021    MR NECK ANGIO WO IV CONTRAST 2021 [facility] [place] [facility]    TONSILLECTOMY      when 11 yo            Relevant Problems   No relevant active problems       Clinical information reviewed:                   NPO Detail:  No data recorded     Physical Exam    Airway  Mallampati: I  TM distance: >3 FB  Neck ROM: full     Cardiovascular   Comments: deferred   Dental    Pulmonary   Comments: deferred   Abdominal     Comments: deferred           Anesthesia Plan    ASA 2     general   (ETT)  The patient is not a current smoker.    intravenous induction   Postoperative administration of opioids is intended.  Anesthetic plan and risks discussed with patient.      
bed mobility training/gait training/transfer training

## 2023-12-19 NOTE — OP NOTE
Cholecystectomy Laparoscopy Operative Note     Date: 2023  OR Location: [facility]    Name: Beatriz Lopez, : 1990, Age: 33 y.o., MRN: [id number], Sex: female    Diagnosis  Pre-op Diagnosis     * Gallbladder polyp [K82.4] Post-op Diagnosis     * Gallbladder polyp [K82.4]     Procedures  Cholecystectomy Laparoscopy  54987 - [facility] LAPAROS[facility] CHOLECYSTECTOMY      Surgeons      * Laurence Man - Primary    Resident/Fellow/Other Assistant:  Surgeon(s) and Role:    Procedure Summary  Anesthesia: General  ASA: II  Anesthesia Staff: Anesthesiologist: Fuentes Zaragoza MD  Estimated Blood Loss: 10 mL  Intra-op Medications:   Medication Name Total Dose   lidocaine-epinephrine (Xylocaine W/EPI) 1 %-1:100,000 injection 10 mL   lactated Ringer's infusion 627.5 mL   ceFAZolin in dextrose (iso-os) (Ancef) IVPB 2 g 2 g              Anesthesia Record               Intraprocedure I/O Totals          Output    Est. Blood Loss 15 mL    Total Output 15 mL          Specimen:   ID Type Source Tests Collected by Time   1 : gallbladder Tissue GALLBLADDER CHOLECYSTECTOMY SURGICAL PATHOLOGY EXAM Laurence Man MD 2023 [id number]        Staff:   Circulator: Yoel Woody RN  Scrub Person: Chio Ng RN         Drains and/or Catheters: * None in log *    Tourniquet Times: n/a        Implants: n/a    Findings: mildly inflamed gallbladder    Indications: Beatriz Lopez is an 33 y.o. female who is having surgery for Gallbladder polyp [K82.4]. Has been having chronic epigastric discomfort.    The patient was seen in the preoperative area. The risks, benefits, complications, treatment options, non-operative alternatives, expected recovery and outcomes were discussed with the patient. The possibilities of reaction to medication, pulmonary aspiration, injury to surrounding structures, bleeding, recurrent infection, the need for additional procedures, failure to diagnose a condition, and creating a complication  requiring transfusion or operation were discussed with the patient. The patient concurred with the proposed plan, giving informed consent.  The site of surgery was properly noted/marked if necessary per policy. The patient has been actively warmed in preoperative area. Preoperative antibiotics have been ordered and given within 1 hours of incision. Venous thrombosis prophylaxis have been ordered including bilateral sequential compression devices    Procedure Details:   The risks, benefits, and alternatives of the procedure were explained to the patient.  Including the risk of bleeding, infection, need for reoperation, injury to surrounding structures including common bile duct.  The patient provided the consent.    The patient underwent anesthesia with endotracheal intubation.  The patient was placed in the supine position with arms out.  The abdomen was prepped with ChloraPrep and draped in the standard sterile fashion.  An 11 blade scalpel was used to make an incision inferior to the umbilicus.  A Kocher was used to grasp the umbilical stalk and elevated.  An 11 blade was used to make an incision in the midline fascia.  A hemostat was used to bluntly dissect through the peritoneum into the intra-abdominal cavity.  A thin S retractor was advanced into the abdomen.  The 12 millimeter [place] trocar was advanced into the abdomen.  The gas was attached and pneumoperitoneum was achieved.  There were no hemodynamic changes when he pneumoperitoneum was achieved.  The patient was then placed in a reverse Trendelenburg position slightly rotated to the left medial side.  3 additional ports sites were placed in an identical fashion.  A needle with 1% lidocaine was advanced through the abdomen under direct visualization in the sub xiphoid area.  An 11 blade scalpel used to make a nick in the skin.  A 5 millimeter trocar was advanced through the abdominal wall.  Two additional trocars were placed in identical fashion in the  subcostal region along the midclavicular line and another laterally.  A grasper was use through the middle port and used to elevate the gallbladder and a graspers were used laterally to grab the dome of the gallbladder and elevate it cephalad.  A [place] with electrocautery was used to score the overlying fatty tissue.  This dissection occurred from the attachments of the liver down to the fundus of the gallbladder and the medial aspect of the gallbladder status min to liver.  Further dissection occurred of the cystic duct and posterior to this the cystic artery was identified.  Both were skeletonized into the liver plate was seen behind the structures.  The critical view of safety was achieved.  A 5 millimeter clip applier was used to apply 3 clips to the cystic artery and 3 clips to the cystic duct a laparoscopic scissor was used to cut between the 1st and 2nd clip on the cystic duct leaving 2 clips in place a hook electrocautery was then used to cauterize the cystic artery above the 3 clips leaving 3 clips on the cystic artery in place electrocautery was then used to dissect the gallbladder free from the liver attachments the gallbladder was entered laterally and the bile was suctioned from the gallbladder.  The gallbladder was then elevated from the liver.   The laparoscopic camera was then advanced through the middle port.  A gallbladder bag was then inserted through the 12 millimeter port.  The gallbladder was then placed within the laparoscopic bag and closed. A suction  was used to suction and irrigate the liver bed and lateral to the liver.  This was performed until the irrigant was clear.  All instruments removed.  The gas ports on the trocars were open to evacuate pneumoperitoneum.  The gallbladder was then removed through the midline.  A small S retractor was advanced into the abdomen and a baby Kocher used to grab the fascia and elevated.  The S retractor was removed a figure-of-eight 2-0  Vicryl suture was used to close the fascial defect a 2nd 2-0 Vicryl suture was used to then reinforced this in close the midline.  A total of 12 cubic centimeters of 1% lidocaine with epinephrine were used within the incisions to generate local anesthesia.  The skin was closed with 4-0 Monocryl.  Steri-Strips were applied.  And a gauze with Tegaderm were applied to the dressings.    Complications:  None; patient tolerated the procedure well.    Disposition: PACU - hemodynamically stable.  Condition: stable         Additional Details: mildly inflamed gallbladder    Attending Attestation: I performed the procedure.    Laurence Man  Phone Number: [phone]

## 2023-12-21 DIAGNOSIS — L03.313 CELLULITIS OF CHEST WALL: Primary | ICD-10-CM

## 2023-12-21 LAB
LABORATORY COMMENT REPORT: NORMAL
PATH REPORT.FINAL DX SPEC: NORMAL
PATH REPORT.GROSS SPEC: NORMAL
PATH REPORT.RELEVANT HX SPEC: NORMAL
PATH REPORT.TOTAL CANCER: NORMAL

## 2023-12-21 RX ORDER — AMOXICILLIN AND CLAVULANATE POTASSIUM 875; 125 MG/1; MG/1
1 TABLET, FILM COATED ORAL 2 TIMES DAILY
Qty: 10 TABLET | Refills: 0 | Status: SHIPPED | OUTPATIENT
Start: 2023-12-21 | End: 2023-12-26

## 2024-01-03 ENCOUNTER — OFFICE VISIT (OUTPATIENT)
Dept: SURGERY | Facility: CLINIC | Age: 34
End: 2024-01-03
Payer: COMMERCIAL

## 2024-01-03 VITALS
WEIGHT: 168 LBS | HEART RATE: 66 BPM | TEMPERATURE: 98.1 F | BODY MASS INDEX: 29.77 KG/M2 | HEIGHT: 63 IN | SYSTOLIC BLOOD PRESSURE: 102 MMHG | DIASTOLIC BLOOD PRESSURE: 60 MMHG | OXYGEN SATURATION: 97 %

## 2024-01-03 DIAGNOSIS — Z09 POSTOPERATIVE EXAMINATION: Primary | ICD-10-CM

## 2024-01-03 DIAGNOSIS — K81.1 CHRONIC CHOLECYSTITIS WITHOUT CALCULUS: ICD-10-CM

## 2024-01-03 PROBLEM — K80.10 CHRONIC CHOLECYSTITIS WITH CALCULUS: Status: ACTIVE | Noted: 2024-01-03

## 2024-01-03 PROCEDURE — 99024 POSTOP FOLLOW-UP VISIT: CPT | Performed by: SURGERY

## 2024-01-03 PROCEDURE — 1036F TOBACCO NON-USER: CPT | Performed by: SURGERY

## 2024-01-03 ASSESSMENT — ENCOUNTER SYMPTOMS
CARDIOVASCULAR NEGATIVE: 1
NEUROLOGICAL NEGATIVE: 1
GASTROINTESTINAL NEGATIVE: 1
RESPIRATORY NEGATIVE: 1
ENDOCRINE NEGATIVE: 1
MUSCULOSKELETAL NEGATIVE: 1
PSYCHIATRIC NEGATIVE: 1
CONSTITUTIONAL NEGATIVE: 1
EYES NEGATIVE: 1
HEMATOLOGIC/LYMPHATIC NEGATIVE: 1

## 2024-01-03 ASSESSMENT — PATIENT HEALTH QUESTIONNAIRE - PHQ9
1. LITTLE INTEREST OR PLEASURE IN DOING THINGS: NOT AT ALL
2. FEELING DOWN, DEPRESSED OR HOPELESS: NOT AT ALL
SUM OF ALL RESPONSES TO PHQ9 QUESTIONS 1 AND 2: 0

## 2024-01-03 ASSESSMENT — PAIN SCALES - GENERAL: PAINLEVEL: 0-NO PAIN

## 2024-01-03 NOTE — LETTER
January 3, 2024     Patient: Beatriz Lopez   YOB: 1990   Date of Visit: 1/3/2024       To Whom It May Concern:    It is my medical opinion that Beatriz Lopez may return to work on 1/4/2023 . No lifting, pushing or pulling greater than 15 pounds until 1/16/2024.      If you have any questions or concerns, please don't hesitate to call.         Sincerely,        Laurence Man MD

## 2024-01-03 NOTE — LETTER
January 3, 2024     Patient: Beatriz Lopez   YOB: 1990   Date of Visit: 1/3/2024       To Whom It May Concern:    It is my medical opinion that Beatriz Lopez may return to work on 1/16/24 with restriction of no lifting, pushing, pulling greater than 15 pounds .    If you have any questions or concerns, please don't hesitate to call.         Sincerely,        Laurence Man MD    CC: No Recipients

## 2024-02-20 ENCOUNTER — OFFICE VISIT (OUTPATIENT)
Dept: PRIMARY CARE | Facility: CLINIC | Age: 34
End: 2024-02-20
Payer: COMMERCIAL

## 2024-02-20 ENCOUNTER — TELEPHONE (OUTPATIENT)
Dept: PRIMARY CARE | Facility: CLINIC | Age: 34
End: 2024-02-20
Payer: COMMERCIAL

## 2024-02-20 VITALS
SYSTOLIC BLOOD PRESSURE: 110 MMHG | DIASTOLIC BLOOD PRESSURE: 72 MMHG | HEART RATE: 95 BPM | WEIGHT: 170.4 LBS | OXYGEN SATURATION: 97 % | BODY MASS INDEX: 30.19 KG/M2

## 2024-02-20 DIAGNOSIS — J01.40 ACUTE NON-RECURRENT PANSINUSITIS: Primary | ICD-10-CM

## 2024-02-20 PROCEDURE — 1036F TOBACCO NON-USER: CPT | Performed by: FAMILY MEDICINE

## 2024-02-20 PROCEDURE — 99213 OFFICE O/P EST LOW 20 MIN: CPT | Performed by: FAMILY MEDICINE

## 2024-02-20 RX ORDER — AMOXICILLIN AND CLAVULANATE POTASSIUM 875; 125 MG/1; MG/1
875 TABLET, FILM COATED ORAL 2 TIMES DAILY
Qty: 14 TABLET | Refills: 0 | Status: SHIPPED | OUTPATIENT
Start: 2024-02-20 | End: 2024-02-27

## 2024-02-20 ASSESSMENT — PATIENT HEALTH QUESTIONNAIRE - PHQ9
2. FEELING DOWN, DEPRESSED OR HOPELESS: NOT AT ALL
SUM OF ALL RESPONSES TO PHQ9 QUESTIONS 1 AND 2: 0
1. LITTLE INTEREST OR PLEASURE IN DOING THINGS: NOT AT ALL

## 2024-02-20 ASSESSMENT — PAIN SCALES - GENERAL: PAINLEVEL: 5
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
## 2024-02-20 NOTE — TELEPHONE ENCOUNTER
Sx started 8 days, ear pressure, sore throat, post nasal drip, right ear pain with diziness, fatigue, cough with green/brown phlegm. Patient has tried mucinex day/night, zyrtec, cough drops, Vicks vapo chest rub, hot teas with honey. Patient tested for covid twice and tests were negative.

## 2024-02-20 NOTE — LETTER
February 20, 2024     Patient: Beatriz Lopez   YOB: 1990   Date of Visit: 2/20/2024       To Whom It May Concern:    Beatriz Lopez was seen in my clinic on 2/20/2024 at 11:30 am. Please excuse Beatriz for her absence from work on this day to make the appointment.    If you have any questions or concerns, please don't hesitate to call.         Sincerely,         Rachael Calderon MD        CC: No Recipients

## 2024-04-10 PROBLEM — K82.4 GALLBLADDER POLYP: Status: RESOLVED | Noted: 2023-11-27 | Resolved: 2024-04-10
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
## 2024-04-10 PROBLEM — K21.9 GERD (GASTROESOPHAGEAL REFLUX DISEASE): Status: ACTIVE | Noted: 2024-04-10

## 2024-04-10 PROBLEM — K81.1 CHRONIC CHOLECYSTITIS WITHOUT CALCULUS: Status: RESOLVED | Noted: 2024-01-03 | Resolved: 2024-04-10

## 2024-04-10 PROBLEM — R42 LIGHTHEADEDNESS: Status: RESOLVED | Noted: 2023-12-19 | Resolved: 2024-04-10

## 2024-04-10 PROBLEM — Z98.891 HISTORY OF CESAREAN SECTION: Status: RESOLVED | Noted: 2023-12-19 | Resolved: 2024-04-10

## 2024-04-10 PROBLEM — L23.7 CONTACT DERMATITIS DUE TO POISON IVY: Status: RESOLVED | Noted: 2023-12-19 | Resolved: 2024-04-10

## 2024-04-10 PROBLEM — R10.9 ABDOMINAL PAIN: Status: RESOLVED | Noted: 2023-12-19 | Resolved: 2024-04-10

## 2024-04-10 PROBLEM — N64.89 FULLNESS OF BREAST: Status: RESOLVED | Noted: 2023-12-19 | Resolved: 2024-04-10

## 2024-04-10 PROBLEM — H81.10 BPPV (BENIGN PAROXYSMAL POSITIONAL VERTIGO): Status: ACTIVE | Noted: 2023-12-19

## 2024-04-10 PROBLEM — R21 RASH: Status: RESOLVED | Noted: 2023-12-19 | Resolved: 2024-04-10

## 2024-04-10 PROBLEM — R55 NEAR SYNCOPE: Status: RESOLVED | Noted: 2023-12-19 | Resolved: 2024-04-10

## 2024-04-10 PROBLEM — K80.10 CHRONIC CHOLECYSTITIS WITH CALCULUS: Status: RESOLVED | Noted: 2024-01-03 | Resolved: 2024-04-10

## 2024-04-10 PROBLEM — O21.9 VOMITING OF PREGNANCY (HHS-HCC): Status: RESOLVED | Noted: 2023-12-19 | Resolved: 2024-04-10

## 2024-04-10 PROBLEM — R07.89 CHEST PAIN, ATYPICAL: Status: RESOLVED | Noted: 2023-12-19 | Resolved: 2024-04-10

## 2024-04-10 PROBLEM — R51.9 HEADACHE: Status: RESOLVED | Noted: 2023-12-19 | Resolved: 2024-04-10

## 2024-04-10 PROBLEM — R42 DIZZINESS: Status: RESOLVED | Noted: 2023-12-19 | Resolved: 2024-04-10

## 2024-04-10 PROBLEM — R10.9 STOMACH CRAMPS: Status: RESOLVED | Noted: 2023-12-19 | Resolved: 2024-04-10

## 2024-04-10 PROBLEM — R11.0 NAUSEA IN ADULT: Status: RESOLVED | Noted: 2023-12-19 | Resolved: 2024-04-10

## 2024-04-10 PROBLEM — U07.1 COVID-19: Status: RESOLVED | Noted: 2023-12-19 | Resolved: 2024-04-10

## 2024-04-10 NOTE — PROGRESS NOTES
Subjective   Patient ID: Beatriz Lopez is a 33 y.o. female who presents for No chief complaint on file..    HPI   Pt comes in feeling run down, not feeling right, and dizzy.  Requesting bloodwork    Past Medical History:   Diagnosis Date    Antiphospholipid antibody with hypercoagulable state (CMS/[facility])     BPPV (benign paroxysmal positional vertigo)     Darrel    Fissure, anal     GERD (gastroesophageal reflux disease)     History of COVID-19 12/2022    History of depression     History of HPV infection     Migraine     Garcia    Protein S deficiency (CMS/[facility])     Seasonal allergies     Vitamin D deficiency      Current Outpatient Medications   Medication Sig Dispense Refill    acetaminophen (Tylenol) 500 mg tablet Take 2 tablets (1,000 mg) by mouth every 6 hours if needed for mild pain (1 - 3). 20 tablet 0    calcium carbonate-vitamin D3 500 mg-5 mcg (200 unit) tablet Take 1 tablet by mouth once daily.      famotidine (Pepcid) 10 mg tablet Take 1 tablet (10 mg) by mouth once daily as needed for heartburn.      ferrous sulfate 325 (65 Fe) MG EC tablet Take 65 mg by mouth 3 times a day with meals. Do not crush, chew, or split.      Linzess 72 mcg capsule Take 1 capsule (72 mcg) by mouth once daily in the morning. Take before meals.       No current facility-administered medications for this visit.       Review of Systems see hpi    Objective   There were no vitals taken for this visit.    Physical Exam  Vitals reviewed.   Constitutional:       Appearance: Normal appearance.   HENT:      Head: Normocephalic and atraumatic.      Right Ear: Tympanic membrane, ear canal and external ear normal.      Left Ear: Tympanic membrane, ear canal and external ear normal.      Mouth/Throat:      Mouth: Mucous membranes are moist.   Neck:      Thyroid: No thyroid mass, thyromegaly or thyroid tenderness.   Cardiovascular:      Rate and Rhythm: Normal rate and regular rhythm.      Heart sounds: Normal heart sounds.   Pulmonary:       Effort: Pulmonary effort is normal.      Breath sounds: Normal breath sounds.   Neurological:      Mental Status: She is alert.      Deep Tendon Reflexes: Reflexes are normal and symmetric.         Assessment/Plan   Problem List Items Addressed This Visit    None  Visit Diagnoses         Codes    Fatigue, unspecified type    -  Primary R53.83    Dizziness     R42

## 2024-04-18 ENCOUNTER — APPOINTMENT (OUTPATIENT)
Dept: PRIMARY CARE | Facility: CLINIC | Age: 34
End: 2024-04-18
Payer: COMMERCIAL

## 2024-05-13 PROCEDURE — 88175 CYTOPATH C/V AUTO FLUID REDO: CPT

## 2024-05-13 PROCEDURE — 87624 HPV HI-RISK TYP POOLED RSLT: CPT

## 2024-05-14 ENCOUNTER — LAB (OUTPATIENT)
Dept: LAB | Facility: LAB | Age: 34
End: 2024-05-14
Payer: COMMERCIAL

## 2024-05-14 ENCOUNTER — LAB REQUISITION (OUTPATIENT)
Dept: LAB | Facility: HOSPITAL | Age: 34
End: 2024-05-14
Payer: COMMERCIAL

## 2024-05-14 DIAGNOSIS — R53.83 OTHER FATIGUE: ICD-10-CM

## 2024-05-14 DIAGNOSIS — Z11.51 ENCOUNTER FOR SCREENING FOR HUMAN PAPILLOMAVIRUS (HPV): ICD-10-CM

## 2024-05-14 DIAGNOSIS — R63.5 ABNORMAL WEIGHT GAIN: Primary | ICD-10-CM

## 2024-05-14 DIAGNOSIS — Z01.419 ENCOUNTER FOR GYNECOLOGICAL EXAMINATION (GENERAL) (ROUTINE) WITHOUT ABNORMAL FINDINGS: ICD-10-CM

## 2024-05-14 LAB
BASOPHILS # BLD AUTO: 0.03 X10*3/UL (ref 0–0.1)
BASOPHILS NFR BLD AUTO: 0.4 %
EOSINOPHIL # BLD AUTO: 0.07 X10*3/UL (ref 0–0.7)
EOSINOPHIL NFR BLD AUTO: 1 %
ERYTHROCYTE [DISTWIDTH] IN BLOOD BY AUTOMATED COUNT: 11.9 % (ref 11.5–14.5)
HCT VFR BLD AUTO: 38.7 % (ref 36–46)
HGB BLD-MCNC: 12.6 G/DL (ref 12–16)
IMM GRANULOCYTES # BLD AUTO: 0.02 X10*3/UL (ref 0–0.7)
IMM GRANULOCYTES NFR BLD AUTO: 0.3 % (ref 0–0.9)
LYMPHOCYTES # BLD AUTO: 1.98 X10*3/UL (ref 1.2–4.8)
LYMPHOCYTES NFR BLD AUTO: 28.1 %
MCH RBC QN AUTO: 29.4 PG (ref 26–34)
MCHC RBC AUTO-ENTMCNC: 32.6 G/DL (ref 32–36)
MCV RBC AUTO: 90 FL (ref 80–100)
MONOCYTES # BLD AUTO: 0.86 X10*3/UL (ref 0.1–1)
MONOCYTES NFR BLD AUTO: 12.2 %
NEUTROPHILS # BLD AUTO: 4.09 X10*3/UL (ref 1.2–7.7)
NEUTROPHILS NFR BLD AUTO: 58 %
NRBC BLD-RTO: 0 /100 WBCS (ref 0–0)
PLATELET # BLD AUTO: 198 X10*3/UL (ref 150–450)
RBC # BLD AUTO: 4.29 X10*6/UL (ref 4–5.2)
TSH SERPL DL<=0.05 MIU/L-ACNC: 1.66 MIU/L (ref 0.27–4.2)
VIT B12 SERPL-MCNC: 378 PG/ML (ref 211–946)
WBC # BLD AUTO: 7.1 X10*3/UL (ref 4.4–11.3)

## 2024-05-14 PROCEDURE — 36415 COLL VENOUS BLD VENIPUNCTURE: CPT

## 2024-05-14 PROCEDURE — 82652 VIT D 1 25-DIHYDROXY: CPT

## 2024-05-14 PROCEDURE — 84443 ASSAY THYROID STIM HORMONE: CPT

## 2024-05-14 PROCEDURE — 82607 VITAMIN B-12: CPT

## 2024-05-14 PROCEDURE — 85025 COMPLETE CBC W/AUTO DIFF WBC: CPT

## 2024-05-16 LAB — 1,25(OH)2D3 SERPL-MCNC: 36.5 PG/ML (ref 19.9–79.3)

## 2024-07-17 ENCOUNTER — APPOINTMENT (OUTPATIENT)
Dept: OBSTETRICS AND GYNECOLOGY | Facility: CLINIC | Age: 34
End: 2024-07-17
Payer: COMMERCIAL

## 2024-07-19 ENCOUNTER — OFFICE VISIT (OUTPATIENT)
Dept: PRIMARY CARE | Facility: CLINIC | Age: 34
End: 2024-07-19
Payer: COMMERCIAL

## 2024-07-19 VITALS
DIASTOLIC BLOOD PRESSURE: 72 MMHG | WEIGHT: 174.2 LBS | SYSTOLIC BLOOD PRESSURE: 116 MMHG | BODY MASS INDEX: 30.86 KG/M2 | OXYGEN SATURATION: 99 % | HEART RATE: 86 BPM

## 2024-07-19 DIAGNOSIS — L72.9 CYST OF SKIN: Primary | ICD-10-CM

## 2024-07-19 PROCEDURE — 99213 OFFICE O/P EST LOW 20 MIN: CPT | Performed by: FAMILY MEDICINE

## 2024-07-19 PROCEDURE — 1036F TOBACCO NON-USER: CPT | Performed by: FAMILY MEDICINE

## 2024-07-19 ASSESSMENT — PATIENT HEALTH QUESTIONNAIRE - PHQ9
SUM OF ALL RESPONSES TO PHQ9 QUESTIONS 1 AND 2: 0
1. LITTLE INTEREST OR PLEASURE IN DOING THINGS: NOT AT ALL
2. FEELING DOWN, DEPRESSED OR HOPELESS: NOT AT ALL

## 2024-07-19 ASSESSMENT — PAIN SCALES - GENERAL: PAINLEVEL: 3

## 2024-07-19 NOTE — PROGRESS NOTES
Subjective   Patient ID: Beatriz Lopez is a 33 y.o. female who presents for Lump on thigh.    HPI   Pt comes in with a lump on her left inner thigh that hurts when she is sitting down; she also has a lump on the back of her right knee.  Noticed about 6 months ago.  The lumps have increased in size.  Initially though it might be a lipoma but it is fluctuating in size.  Sitting : it feels like she's sitting on a marble.      Past Medical History:   Diagnosis Date    Antiphospholipid antibody with hypercoagulable state (Multi)     BPPV (benign paroxysmal positional vertigo)     Darrel    Fissure, anal     GERD (gastroesophageal reflux disease)     History of COVID-19 12/2022    History of depression     History of HPV infection     Migraine     Garcia    Protein S deficiency (Multi)     Seasonal allergies     Vitamin D deficiency      Current Outpatient Medications   Medication Sig Dispense Refill    acetaminophen (Tylenol) 500 mg tablet Take 2 tablets (1,000 mg) by mouth every 6 hours if needed for mild pain (1 - 3). 20 tablet 0    calcium carbonate-vitamin D3 500 mg-5 mcg (200 unit) tablet Take 1 tablet by mouth once daily.      famotidine (Pepcid) 10 mg tablet Take 1 tablet (10 mg) by mouth once daily as needed for heartburn.      ferrous sulfate 325 (65 Fe) MG EC tablet Take 65 mg by mouth 3 times a day with meals. Do not crush, chew, or split.      Linzess 72 mcg capsule Take 1 capsule (72 mcg) by mouth once daily in the morning. Take before meals.       No current facility-administered medications for this visit.       Review of Systems see hpi    Objective   /72   Pulse 86   Wt 79 kg (174 lb 3.2 oz)   SpO2 99%   BMI 30.86 kg/m²     Physical Exam  Vitals reviewed.   Constitutional:       Appearance: Normal appearance.   Skin:     Comments: Left thigh: small nickel sized cyst felt in medial upper thigh; no signs of infection.  Tender to palpation    Right knee: no abnormal nodules/masses felt; easily  felt tendon and sheath and fatty tissue around popliteal area.   Neurological:      Mental Status: She is alert.         Assessment/Plan   Problem List Items Addressed This Visit    None  Visit Diagnoses         Codes    Cyst of skin    -  Primary L72.9        Reassured pt benign cyst; can fluctuate in size.  If it gets bigger and causes more discomfort, can refer to surgery for removal.

## 2024-08-09 ENCOUNTER — TELEPHONE (OUTPATIENT)
Dept: PRIMARY CARE | Facility: CLINIC | Age: 34
End: 2024-08-09
Payer: COMMERCIAL

## 2024-08-09 ENCOUNTER — DOCUMENTATION (OUTPATIENT)
Dept: PRIMARY CARE | Facility: CLINIC | Age: 34
End: 2024-08-09
Payer: COMMERCIAL

## 2024-08-09 ENCOUNTER — APPOINTMENT (OUTPATIENT)
Dept: RADIOLOGY | Facility: HOSPITAL | Age: 34
End: 2024-08-09
Payer: COMMERCIAL

## 2024-08-09 ENCOUNTER — HOSPITAL ENCOUNTER (EMERGENCY)
Facility: HOSPITAL | Age: 34
Discharge: HOME | End: 2024-08-09
Attending: EMERGENCY MEDICINE
Payer: COMMERCIAL

## 2024-08-09 VITALS
HEIGHT: 63 IN | RESPIRATION RATE: 15 BRPM | DIASTOLIC BLOOD PRESSURE: 83 MMHG | WEIGHT: 170 LBS | BODY MASS INDEX: 30.12 KG/M2 | TEMPERATURE: 98.4 F | HEART RATE: 73 BPM | SYSTOLIC BLOOD PRESSURE: 116 MMHG | OXYGEN SATURATION: 100 %

## 2024-08-09 DIAGNOSIS — R93.5 ABNORMAL ABDOMINAL CT SCAN: Primary | ICD-10-CM

## 2024-08-09 DIAGNOSIS — R10.13 ABDOMINAL PAIN, EPIGASTRIC: Primary | ICD-10-CM

## 2024-08-09 LAB
ALBUMIN SERPL BCP-MCNC: 4 G/DL (ref 3.4–5)
ALP SERPL-CCNC: 46 U/L (ref 33–110)
ALT SERPL W P-5'-P-CCNC: 9 U/L (ref 7–45)
ANION GAP SERPL CALC-SCNC: 10 MMOL/L (ref 10–20)
APPEARANCE UR: CLEAR
AST SERPL W P-5'-P-CCNC: 13 U/L (ref 9–39)
BASOPHILS # BLD AUTO: 0.03 X10*3/UL (ref 0–0.1)
BASOPHILS NFR BLD AUTO: 0.6 %
BILIRUB DIRECT SERPL-MCNC: 0 MG/DL (ref 0–0.3)
BILIRUB SERPL-MCNC: 0.3 MG/DL (ref 0–1.2)
BILIRUB UR STRIP.AUTO-MCNC: NEGATIVE MG/DL
BUN SERPL-MCNC: 7 MG/DL (ref 6–23)
CALCIUM SERPL-MCNC: 8.9 MG/DL (ref 8.6–10.3)
CARDIAC TROPONIN I PNL SERPL HS: <3 NG/L (ref 0–13)
CHLORIDE SERPL-SCNC: 109 MMOL/L (ref 98–107)
CO2 SERPL-SCNC: 27 MMOL/L (ref 21–32)
COLOR UR: COLORLESS
CREAT SERPL-MCNC: 0.74 MG/DL (ref 0.5–1.05)
EGFRCR SERPLBLD CKD-EPI 2021: >90 ML/MIN/1.73M*2
EOSINOPHIL # BLD AUTO: 0.05 X10*3/UL (ref 0–0.7)
EOSINOPHIL NFR BLD AUTO: 0.9 %
ERYTHROCYTE [DISTWIDTH] IN BLOOD BY AUTOMATED COUNT: 11.8 % (ref 11.5–14.5)
GLUCOSE SERPL-MCNC: 92 MG/DL (ref 74–99)
GLUCOSE UR STRIP.AUTO-MCNC: NORMAL MG/DL
HCG UR QL IA.RAPID: NEGATIVE
HCT VFR BLD AUTO: 36 % (ref 36–46)
HGB BLD-MCNC: 12.2 G/DL (ref 12–16)
IMM GRANULOCYTES # BLD AUTO: 0.03 X10*3/UL (ref 0–0.7)
IMM GRANULOCYTES NFR BLD AUTO: 0.6 % (ref 0–0.9)
KETONES UR STRIP.AUTO-MCNC: NEGATIVE MG/DL
LEUKOCYTE ESTERASE UR QL STRIP.AUTO: ABNORMAL
LIPASE SERPL-CCNC: 12 U/L (ref 9–82)
LYMPHOCYTES # BLD AUTO: 1.78 X10*3/UL (ref 1.2–4.8)
LYMPHOCYTES NFR BLD AUTO: 32.8 %
MCH RBC QN AUTO: 30.2 PG (ref 26–34)
MCHC RBC AUTO-ENTMCNC: 33.9 G/DL (ref 32–36)
MCV RBC AUTO: 89 FL (ref 80–100)
MONOCYTES # BLD AUTO: 0.6 X10*3/UL (ref 0.1–1)
MONOCYTES NFR BLD AUTO: 11.1 %
NEUTROPHILS # BLD AUTO: 2.93 X10*3/UL (ref 1.2–7.7)
NEUTROPHILS NFR BLD AUTO: 54 %
NITRITE UR QL STRIP.AUTO: NEGATIVE
NRBC BLD-RTO: 0 /100 WBCS (ref 0–0)
PH UR STRIP.AUTO: 5.5 [PH]
PLATELET # BLD AUTO: 211 X10*3/UL (ref 150–450)
POTASSIUM SERPL-SCNC: 3.8 MMOL/L (ref 3.5–5.3)
PROT SERPL-MCNC: 6.4 G/DL (ref 6.4–8.2)
PROT UR STRIP.AUTO-MCNC: NEGATIVE MG/DL
RBC # BLD AUTO: 4.04 X10*6/UL (ref 4–5.2)
RBC # UR STRIP.AUTO: ABNORMAL /UL
RBC #/AREA URNS AUTO: NORMAL /HPF
SODIUM SERPL-SCNC: 142 MMOL/L (ref 136–145)
SP GR UR STRIP.AUTO: 1.01
SQUAMOUS #/AREA URNS AUTO: NORMAL /HPF
UROBILINOGEN UR STRIP.AUTO-MCNC: NORMAL MG/DL
WBC # BLD AUTO: 5.4 X10*3/UL (ref 4.4–11.3)
WBC #/AREA URNS AUTO: NORMAL /HPF

## 2024-08-09 PROCEDURE — 2500000004 HC RX 250 GENERAL PHARMACY W/ HCPCS (ALT 636 FOR OP/ED)

## 2024-08-09 PROCEDURE — 80048 BASIC METABOLIC PNL TOTAL CA: CPT | Performed by: EMERGENCY MEDICINE

## 2024-08-09 PROCEDURE — 87086 URINE CULTURE/COLONY COUNT: CPT | Mod: GENLAB | Performed by: EMERGENCY MEDICINE

## 2024-08-09 PROCEDURE — 84484 ASSAY OF TROPONIN QUANT: CPT | Performed by: EMERGENCY MEDICINE

## 2024-08-09 PROCEDURE — 74177 CT ABD & PELVIS W/CONTRAST: CPT

## 2024-08-09 PROCEDURE — 96361 HYDRATE IV INFUSION ADD-ON: CPT

## 2024-08-09 PROCEDURE — 96374 THER/PROPH/DIAG INJ IV PUSH: CPT

## 2024-08-09 PROCEDURE — 2500000004 HC RX 250 GENERAL PHARMACY W/ HCPCS (ALT 636 FOR OP/ED): Performed by: EMERGENCY MEDICINE

## 2024-08-09 PROCEDURE — 84075 ASSAY ALKALINE PHOSPHATASE: CPT | Performed by: EMERGENCY MEDICINE

## 2024-08-09 PROCEDURE — 2550000001 HC RX 255 CONTRASTS: Performed by: EMERGENCY MEDICINE

## 2024-08-09 PROCEDURE — 71260 CT THORAX DX C+: CPT | Performed by: STUDENT IN AN ORGANIZED HEALTH CARE EDUCATION/TRAINING PROGRAM

## 2024-08-09 PROCEDURE — 99284 EMERGENCY DEPT VISIT MOD MDM: CPT | Mod: 25

## 2024-08-09 PROCEDURE — 83690 ASSAY OF LIPASE: CPT | Performed by: EMERGENCY MEDICINE

## 2024-08-09 PROCEDURE — 81025 URINE PREGNANCY TEST: CPT | Performed by: EMERGENCY MEDICINE

## 2024-08-09 PROCEDURE — 36415 COLL VENOUS BLD VENIPUNCTURE: CPT | Performed by: EMERGENCY MEDICINE

## 2024-08-09 PROCEDURE — 85025 COMPLETE CBC W/AUTO DIFF WBC: CPT | Performed by: EMERGENCY MEDICINE

## 2024-08-09 PROCEDURE — 74177 CT ABD & PELVIS W/CONTRAST: CPT | Performed by: STUDENT IN AN ORGANIZED HEALTH CARE EDUCATION/TRAINING PROGRAM

## 2024-08-09 PROCEDURE — 81001 URINALYSIS AUTO W/SCOPE: CPT | Performed by: EMERGENCY MEDICINE

## 2024-08-09 RX ORDER — FAMOTIDINE 10 MG/ML
INJECTION INTRAVENOUS
Status: COMPLETED
Start: 2024-08-09 | End: 2024-08-09

## 2024-08-09 RX ORDER — FAMOTIDINE 10 MG/ML
20 INJECTION INTRAVENOUS ONCE
Status: COMPLETED | OUTPATIENT
Start: 2024-08-09 | End: 2024-08-09

## 2024-08-09 ASSESSMENT — PAIN - FUNCTIONAL ASSESSMENT: PAIN_FUNCTIONAL_ASSESSMENT: 0-10

## 2024-08-09 ASSESSMENT — COLUMBIA-SUICIDE SEVERITY RATING SCALE - C-SSRS
2. HAVE YOU ACTUALLY HAD ANY THOUGHTS OF KILLING YOURSELF?: NO
6. HAVE YOU EVER DONE ANYTHING, STARTED TO DO ANYTHING, OR PREPARED TO DO ANYTHING TO END YOUR LIFE?: NO
1. IN THE PAST MONTH, HAVE YOU WISHED YOU WERE DEAD OR WISHED YOU COULD GO TO SLEEP AND NOT WAKE UP?: NO

## 2024-08-09 ASSESSMENT — PAIN SCALES - GENERAL: PAINLEVEL_OUTOF10: 5 - MODERATE PAIN

## 2024-08-09 NOTE — TELEPHONE ENCOUNTER
Patient informed.  She is asking if you can give her a work note because she had to leave work today. Call pt when ready and she will come to pick it up

## 2024-08-09 NOTE — ED PROVIDER NOTES
HPI   Chief Complaint   Patient presents with    Abdominal Pain     Pt states having epigastric pain starting . Pt does have hx GERD. Pt states when it started she had trouble swallowing food , felt epigastric burning, she became nauseous and has had vomiting. Pt states this morning she tasted blood in her mouth and when she vomited it was orangish. Pt also having some diarrhea. Pt also recently off birth control and she has been on her period the last 2 weeks       HPI  Patient is a 33-year-old female with history of GERD for which she takes omeprazole daily, presenting to the ED today for epigastric pain.  Patient states that her epigastric pain started on .  Her pain has remained persistent since , to the point where she is having difficulty consuming solid foods due to exacerbation of her pain.  She has continued to drink fluids and soup. Today, she had several episodes  of  vomiting and tasted blood in her mouth. She called  her  PCP who  recommended that  she couple up on her  omeprazole.  She then called her GI doc who  told her she cannot  be seen  until  September.  Given this, she presented to urgent care for her symptoms, but was  sent  here  to  the ED.  She  currently  complains of continued epigastric  abdominal pain.  She otherwise denies any chest pain, shortness of breath,  recent  fever, or dysuria.      Patient History   Past Medical History:   Diagnosis Date    Antiphospholipid antibody with hypercoagulable state (Multi)     BPPV (benign paroxysmal positional vertigo)     Darrel    Fissure, anal     GERD (gastroesophageal reflux disease)     History of COVID-19 2022    History of depression     History of HPV infection     Migraine     Garcia    Protein S deficiency (Multi)     Seasonal allergies     Vitamin D deficiency      Past Surgical History:   Procedure Laterality Date     SECTION, LOW TRANSVERSE  2016. 2021    CHOLECYSTECTOMY  12/10/2023    Lap  debra    MR HEAD ANGIO WO IV CONTRAST  02/27/2021    MR HEAD ANGIO WO IV CONTRAST 2/27/2021 [facility] [place] [facility]    MR NECK ANGIO WO IV CONTRAST  02/27/2021    MR NECK ANGIO WO IV CONTRAST 2/27/2021 [facility] [place]    TONSILLECTOMY      when 9 yo     Family History   Problem Relation Name Age of Onset    Brain Aneurysm Mother Mary Jane murguia     Stroke Mother Mary Jane murguia     Other (meningitis) Mother Mary Jane mruguia     Depression Mother Mary Jane murguia     Miscarriages / Stillbirths Mother Mary Jane murguia     Valvular heart disease Son Russ     Heart disease Son Russ     Gallbladder disease Maternal Grandmother       Social History     Tobacco Use    Smoking status: Never    Smokeless tobacco: Never   Vaping Use    Vaping status: Never Used   Substance Use Topics    Alcohol use: Yes     Comment: socailly    Drug use: Never       Physical Exam   ED Triage Vitals [08/09/24 1133]   Temperature Heart Rate Respirations BP   36.9 °C (98.4 °F) 79 15 110/71      Pulse Ox Temp Source Heart Rate Source Patient Position   98 % Temporal -- --      BP Location FiO2 (%)     -- --       Physical Exam  Vitals and nursing note reviewed.   Constitutional:       General: She is not in acute distress.     Appearance: She is not toxic-appearing.   HENT:      Head: Normocephalic.      Mouth/Throat:      Mouth: Mucous membranes are moist.   Eyes:      Extraocular Movements: Extraocular movements intact.      Conjunctiva/sclera: Conjunctivae normal.   Cardiovascular:      Rate and Rhythm: Normal rate and regular rhythm.      Pulses: Normal pulses.   Pulmonary:      Effort: Pulmonary effort is normal. No respiratory distress.      Breath sounds: Normal breath sounds. No wheezing.   Abdominal:      General: There is no distension.      Palpations: Abdomen is soft.      Comments: Epigastric tenderness to palpation   Musculoskeletal:         General: No swelling.      Cervical back: Neck supple.   Skin:     General: Skin is warm and dry.      Capillary  Refill: Capillary refill takes less than 2 seconds.   Neurological:      General: No focal deficit present.      Mental Status: She is alert. Mental status is at baseline.           ED Course & MDM   Diagnoses as of 08/09/24 1840   Abdominal pain, epigastric                 No data recorded     [place] Coma Scale Score: 15 (08/09/24 1135 : Molly Heard RN)                           Medical Decision Making  Patient was seen and evaluated for epigastric abdominal pain consistent with her GERD exacerbation.  Differential diagnosis includes but is not limited to Gastritis, Appendicitis, Bowel Obstruction, Viral Illness, Cholecystitis, Colitis, Pancreatitis, Hepatitis, PUD, Ureteral stone, Perforated viscus, Diverticulitis, UTI.  Peripheral IV is established and patient is started on a 1L bolus of normal saline.  She is administered  pepcid 20 mg IV for her pain.  Additional labs and imaging are ordered for further evaluation of the patient's symptoms.    CBC is unremarkable. BMP is unremarkable. LFTs are unremarkable.  Lipase is normal at  12.  Urinalysis not indicative of infection.  CT chest abdomen pelvis w IV contrast   Final Result   CHEST:   1.  No acute findings in the thorax.        ABDOMEN-PELVIS:   1.  Mild periportal edema. 17 mm segment IV B indeterminate   hypoenhancing lesion. These include focal fat or hemangioma, may be   further evaluated with MRI of the liver.   2. 4.4 x 4.2 cm left adnexal, likely benign cyst. Recommend 6-12 week   ultrasound follow-up.             Signed by: Juan Manuel Hernandez 8/9/2024 4:07 PM   Dictation workstation:   [id number]        On reevaluation, patient is resting comfortably in bed. Patient was informed of their lab and imaging results, and all questions and concerns were answered. Discharge planning with close outpatient follow-up was discussed at this time, to which the patient was agreeable. Strict return precautions were given, and patient was discharged home in  stable condition.    Procedure  Procedures     Priyanka HATFIELD MD  08/13/24 0001

## 2024-08-09 NOTE — LETTER
August 9, 2024     Patient: Beatriz Lopez   YOB: 1990   Date of Visit: 8/9/2024       To Whom It May Concern:    Please excuse Beatriz for her absence from work today due to medical reasons, the patient may return to work on August 12, 2024 with no restrictions.  If you have any questions or concerns, please don't hesitate to call.         Sincerely,         Rachael Calderon M.D.

## 2024-08-09 NOTE — ED TRIAGE NOTES
Pt states having epigastric pain starting Sunday. Pt does have hx GERD. Pt states when it started she had trouble swallowing food Sunday, felt epigastric burning, she became nauseous and has had vomiting. Pt states this morning she tasted blood in her mouth and when she vomited it was orangish. Pt also having some diarrhea. Pt also recently off birth control and she has been on her period the last 2 weeks

## 2024-08-10 LAB — BACTERIA UR CULT: NO GROWTH

## 2024-08-24 ENCOUNTER — APPOINTMENT (OUTPATIENT)
Dept: RADIOLOGY | Facility: HOSPITAL | Age: 34
End: 2024-08-24
Payer: COMMERCIAL

## 2024-09-26 ENCOUNTER — HOSPITAL ENCOUNTER (OUTPATIENT)
Dept: RADIOLOGY | Facility: HOSPITAL | Age: 34
End: 2024-09-26
Payer: COMMERCIAL

## 2024-09-27 ENCOUNTER — APPOINTMENT (OUTPATIENT)
Dept: RADIOLOGY | Facility: HOSPITAL | Age: 34
End: 2024-09-27
Payer: COMMERCIAL

## 2024-10-14 ENCOUNTER — APPOINTMENT (OUTPATIENT)
Dept: RADIOLOGY | Facility: HOSPITAL | Age: 34
End: 2024-10-14
Payer: COMMERCIAL

## 2024-10-15 ENCOUNTER — APPOINTMENT (OUTPATIENT)
Dept: OTOLARYNGOLOGY | Facility: CLINIC | Age: 34
End: 2024-10-15
Payer: COMMERCIAL

## 2024-11-07 ENCOUNTER — APPOINTMENT (OUTPATIENT)
Dept: OTOLARYNGOLOGY | Facility: CLINIC | Age: 34
End: 2024-11-07
Payer: COMMERCIAL

## 2025-01-12 ENCOUNTER — OFFICE VISIT (OUTPATIENT)
Dept: URGENT CARE | Age: 35
End: 2025-01-12
Payer: COMMERCIAL

## 2025-01-12 VITALS
HEIGHT: 63 IN | WEIGHT: 174.38 LBS | BODY MASS INDEX: 30.9 KG/M2 | HEART RATE: 86 BPM | DIASTOLIC BLOOD PRESSURE: 65 MMHG | TEMPERATURE: 98.4 F | OXYGEN SATURATION: 98 % | RESPIRATION RATE: 16 BRPM | SYSTOLIC BLOOD PRESSURE: 130 MMHG

## 2025-01-12 DIAGNOSIS — K04.7 DENTAL INFECTION: ICD-10-CM

## 2025-01-12 DIAGNOSIS — H69.91 EUSTACHIAN TUBE DYSFUNCTION, RIGHT: ICD-10-CM

## 2025-01-12 DIAGNOSIS — H66.91 RIGHT OTITIS MEDIA, UNSPECIFIED OTITIS MEDIA TYPE: Primary | ICD-10-CM

## 2025-01-12 DIAGNOSIS — J34.89 TENDERNESS OVER MAXILLARY SINUS: ICD-10-CM

## 2025-01-12 PROCEDURE — 1036F TOBACCO NON-USER: CPT

## 2025-01-12 PROCEDURE — 3008F BODY MASS INDEX DOCD: CPT

## 2025-01-12 PROCEDURE — 99213 OFFICE O/P EST LOW 20 MIN: CPT

## 2025-01-12 RX ORDER — AMOXICILLIN AND CLAVULANATE POTASSIUM 875; 125 MG/1; MG/1
1 TABLET, FILM COATED ORAL 2 TIMES DAILY
Qty: 20 TABLET | Refills: 0 | Status: SHIPPED | OUTPATIENT
Start: 2025-01-12 | End: 2025-01-22

## 2025-01-12 NOTE — PROGRESS NOTES
Subjective   Patient ID: Beatriz Lopez is a 34 y.o. female. They present today with a chief complaint of Earache (Ear pain on RIGHT side since beginning of December.) and Fever.    History of Present Illness  34-year-old female presents urgent care for complaint of right ear pain intermittently since beginning of December.  States she is also had intermittent fevers/sore throat/sinus pain.  States she is currently having sinus pain over her right maxillary sinus she is unsure possibly due to sinus infection or her dental infection that is being managed by her dentist and she states she is going to have an upcoming root canal.  Denies any current fevers or chills, nausea vomiting sweats, chest pain shortness breath, abdominal pain, sore throat or rash.  Right TM has purulent fluid and mild bulging with out erythema.  Some tenderness to traction of the tragus of the right ear.  Left TM has some clear fluid without bulging or erythema.  Bilateral processes unremarkable.  Prescribed Augmentin.  Patient dates she already has Flonase at home.  Educated on supportive care.  Placed ENT referral to call today to schedule next available appointment for reassessment.  Return/ER precautions.  Keep your appointment with her dentist and follow-up with PCP in 1 to 2 weeks.  Patient agrees with plan.          Past Medical History  Allergies as of 01/12/2025 - Reviewed 01/12/2025   Allergen Reaction Noted    Dilaudid [hydromorphone] Hives and Nausea/vomiting 11/27/2023    Sudafed [pseudoephedrine hcl] Other 11/27/2023    Buspirone hcl Headache 01/03/2024    Escitalopram GI Upset 02/20/2024    Sertraline Nausea And Vomiting 02/20/2024    Venlafaxine Unknown 01/03/2024       (Not in a hospital admission)       Past Medical History:   Diagnosis Date    Antiphospholipid antibody with hypercoagulable state (Multi)     BPPV (benign paroxysmal positional vertigo)     Darrel    Fissure, anal     GERD (gastroesophageal reflux disease)      "History of COVID-19 2022    History of depression     History of HPV infection     Migraine     Garcia    Protein S deficiency (Multi)     Seasonal allergies     Vitamin D deficiency        Past Surgical History:   Procedure Laterality Date     SECTION, LOW TRANSVERSE  2016. 2021    CHOLECYSTECTOMY  12/10/2023    Lap debra    MR HEAD ANGIO WO IV CONTRAST  2021    MR HEAD ANGIO [place] [facility] [place] [facility]    MR NECK ANGIO WO IV CONTRAST  2021    MR NECK ANGIO WO IV CONTRAST [place] [facility] [place]    TONSILLECTOMY      when 11 yo        reports that she has never smoked. She has never used smokeless tobacco. She reports current alcohol use. She reports that she does not use drugs.    Review of Systems  Review of Systems   All other systems reviewed and are negative.                                 Objective    Vitals:    25 0856   BP: 130/65   Pulse: 86   Resp: 16   Temp: 36.9 °C (98.4 °F)   TempSrc: Oral   SpO2: 98%   Weight: 79.1 kg (174 lb 6.1 oz)   Height: 1.6 m (5' 3\")     Patient's last menstrual period was 2024.    Physical Exam  Vitals reviewed.   Constitutional:       General: She is not in acute distress.     Appearance: Normal appearance. She is not ill-appearing, toxic-appearing or diaphoretic.   HENT:      Head: Normocephalic and atraumatic.      Comments: Tenderness to the right maxillary sinus.     Right Ear: Ear canal and external ear normal.      Left Ear: Ear canal and external ear normal.      Ears:      Comments: Right TM has purulent fluid and mild bulging with out erythema.  Some tenderness to traction of the tragus of the right ear.  Left TM has some clear fluid without bulging or erythema.  Bilateral processes unremarkable.     Nose: Nose normal.      Mouth/Throat:      Mouth: Mucous membranes are moist.      Comments: Tonsils surgically absent.  Pharynx unremarkable.  Uvula midline normal.  Tenderness to right maxillary " molar.  No obvious drainable abscess.  Cardiovascular:      Rate and Rhythm: Normal rate and regular rhythm.   Pulmonary:      Effort: Pulmonary effort is normal. No respiratory distress.      Breath sounds: Normal breath sounds. No stridor. No wheezing, rhonchi or rales.   Abdominal:      General: Abdomen is flat.      Palpations: Abdomen is soft.      Tenderness: There is no abdominal tenderness. There is no right CVA tenderness or left CVA tenderness.   Musculoskeletal:      Cervical back: Normal range of motion and neck supple. Tenderness present. No rigidity.   Lymphadenopathy:      Cervical: Cervical adenopathy present.   Skin:     General: Skin is warm and dry.   Neurological:      General: No focal deficit present.      Mental Status: She is alert and oriented to person, place, and time.   Psychiatric:         Mood and Affect: Mood normal.         Behavior: Behavior normal.         Procedures    Point of Care Test & Imaging Results from this visit  No results found for this visit on 01/12/25.   No results found.    Diagnostic study results (if any) were reviewed by Ayla Humphrey PA-C.    Assessment/Plan   Allergies, medications, history, and pertinent labs/EKGs/Imaging reviewed by Ayla Humphrey PA-C.     Medical Decision Making  34-year-old female presents urgent care for complaint of right ear pain intermittently since beginning of December.  States she is also had intermittent fevers/sore throat/sinus pain.  States she is currently having sinus pain over her right maxillary sinus she is unsure possibly due to sinus infection or her dental infection that is being managed by her dentist and she states she is going to have an upcoming root canal.  Denies any current fevers or chills, nausea vomiting sweats, chest pain shortness breath, abdominal pain, sore throat or rash.  Right TM has purulent fluid and mild bulging with out erythema.  Some tenderness to traction of the tragus of the right ear.   Left TM has some clear fluid without bulging or erythema.  Bilateral processes unremarkable.  Prescribed Augmentin.  Patient dates she already has Flonase at home.  Educated on supportive care.  Placed ENT referral to call today to schedule next available appointment for reassessment.  Return/ER precautions.  Keep your appointment with her dentist and follow-up with PCP in 1 to 2 weeks.  Patient agrees with plan.    Orders and Diagnoses  There are no diagnoses linked to this encounter.    Medical Admin Record      Patient disposition: Home    Electronically signed by Ayla Humphrey PA-C  9:02 AM

## 2025-01-12 NOTE — PATIENT INSTRUCTIONS
Take medication as prescribed.  Continue using your Flonase at home.  Maintain adequate hydration and nutrition.  If symptoms worsen, do not improve, or any other concerning or worrisome symptoms develop please return to the clinic or go to the emergency department.  Call referral number today or tomorrow morning to schedule next available follow-up appointment with ENT.  Keep your upcoming appointment with your dentist.  Follow-up with PCP in 1 to 2 weeks.   none

## 2025-01-17 ENCOUNTER — APPOINTMENT (OUTPATIENT)
Dept: RADIOLOGY | Facility: HOSPITAL | Age: 35
End: 2025-01-17
Payer: COMMERCIAL

## 2025-01-27 ENCOUNTER — APPOINTMENT (OUTPATIENT)
Dept: RADIOLOGY | Facility: HOSPITAL | Age: 35
End: 2025-01-27
Payer: COMMERCIAL

## 2025-02-19 ENCOUNTER — APPOINTMENT (OUTPATIENT)
Dept: AUDIOLOGY | Facility: CLINIC | Age: 35
End: 2025-02-19
Payer: COMMERCIAL

## 2025-02-19 ENCOUNTER — APPOINTMENT (OUTPATIENT)
Dept: OTOLARYNGOLOGY | Facility: CLINIC | Age: 35
End: 2025-02-19
Payer: COMMERCIAL

## 2025-02-19 VITALS — TEMPERATURE: 97.8 F | WEIGHT: 173 LBS | BODY MASS INDEX: 30.65 KG/M2 | HEIGHT: 63 IN

## 2025-02-19 DIAGNOSIS — H92.01 RIGHT EAR PAIN: ICD-10-CM

## 2025-02-19 DIAGNOSIS — H92.01 OTALGIA, RIGHT: Primary | ICD-10-CM

## 2025-02-19 DIAGNOSIS — H93.11 TINNITUS OF RIGHT EAR: ICD-10-CM

## 2025-02-19 DIAGNOSIS — J31.0 CHRONIC RHINITIS: Primary | ICD-10-CM

## 2025-02-19 DIAGNOSIS — J34.2 DEVIATED NASAL SEPTUM: ICD-10-CM

## 2025-02-19 DIAGNOSIS — R42 DIZZINESS: ICD-10-CM

## 2025-02-19 PROCEDURE — 3008F BODY MASS INDEX DOCD: CPT | Performed by: OTOLARYNGOLOGY

## 2025-02-19 PROCEDURE — 99203 OFFICE O/P NEW LOW 30 MIN: CPT | Performed by: OTOLARYNGOLOGY

## 2025-02-19 PROCEDURE — 1036F TOBACCO NON-USER: CPT | Performed by: OTOLARYNGOLOGY

## 2025-02-19 PROCEDURE — 92550 TYMPANOMETRY & REFLEX THRESH: CPT | Performed by: AUDIOLOGIST

## 2025-02-19 PROCEDURE — 31231 NASAL ENDOSCOPY DX: CPT | Performed by: OTOLARYNGOLOGY

## 2025-02-19 PROCEDURE — 92557 COMPREHENSIVE HEARING TEST: CPT | Performed by: AUDIOLOGIST

## 2025-02-19 RX ORDER — CETIRIZINE HYDROCHLORIDE 10 MG/1
10 TABLET ORAL DAILY
COMMUNITY

## 2025-02-19 RX ORDER — AZELASTINE 1 MG/ML
SPRAY, METERED NASAL
Qty: 30 ML | Refills: 11 | Status: SHIPPED | OUTPATIENT
Start: 2025-02-19

## 2025-02-19 RX ORDER — FLUTICASONE PROPIONATE 50 MCG
1 SPRAY, SUSPENSION (ML) NASAL DAILY
COMMUNITY

## 2025-02-19 NOTE — PROGRESS NOTES
AUDIOLOGY ADULT AUDIOMETRIC EVALUATION    Name:  Beatriz Lopez  :  1990  Age:  34 y.o.  Date of Evaluation:  2025    Reason for visit: Ms. Lopez is seen in the clinic today at the request of Ervin Rojo MD in otolaryngology for an audiologic evaluation.     HISTORY  The patient reported experiencing right ear pain and occasional right-sided tinnitus.  She was seen in the urgent care recently for a right-sided ear infection.  She has been dizzy for three months.      EVALUATION  See scanned audiogram: “Media” > “Audiology Report”.      RESULTS  Otoscopic Evaluation:  Right Ear: clear ear canal  Left Ear: clear ear canal    Immittance Measures:  Tympanometry:  Right Ear: Type A, normal tympanic membrane mobility with normal middle ear pressure   Left Ear: Type A, normal tympanic membrane mobility with normal middle ear pressure     Acoustic Reflexes:  Ipsilateral Right Ear: Present at normal levels at 500-4000 Hz   Ipsilateral Left Ear: Present at normal levels at 500-4000 Hz   Contralateral Right Ear: did not evaluate  Contralateral Left Ear: did not evaluate    Distortion Product Otoacoustic Emissions (DPOAEs):  Right Ear: Present at 4560-9536 Hz; absent at 8000 Hz   Left Ear: Present at 1988-1050 Hz; absent at 8000 Hz     Audiometry:  Test Technique and Reliability:   Standard audiometry via supra-aural headphones. Reliability is good.    Pure tone air and bone conduction audiometry:  Right Ear: normal hearing   Left Ear: normal hearing     Speech Audiometry (Word Recognition Scores):   Right Ear: Excellent, 100% in quiet at an average conversational level   Left Ear: Excellent, 100% in quiet at an average conversational level     IMPRESSIONS  Results of today's audiometric evaluation revealed normal hearing with normal middle ear function bilaterally.    The presence of acoustic reflexes within normal intensity limits is consistent with normal middle ear and brainstem function, and  suggests that auditory sensitivity is not significantly impaired. An elevated or absent acoustic reflex threshold is consistent with a middle ear disorder, hearing loss in the stimulated ear, and/or interruption of neural innervation of the stapedius muscle. Present DPOAEs suggest normal/near normal cochlear outer hair cell function and are consistent with no greater than a mild hearing loss at those frequencies. Absent DPOAEs are consistent with abnormal cochlear outer hair cell function at those frequencies.    RECOMMENDATIONS  - Follow up with otolaryngology today as scheduled.  - Audiologic evaluation in conjunction with otologic care or if an acute change is noted.   - Follow-up with medical care team as planned.    PATIENT EDUCATION  Discussed results, impressions and recommendations with the patient. Questions were addressed and the patient was encouraged to contact our office should concerns arise.    Time for this encounter: 9:00-9:30    Gloria Caban M.A., CCC-A   Licensed Audiologist

## 2025-02-20 NOTE — PROGRESS NOTES
HPI  Beatriz Lopez is a 34 y.o. female bothered by intermittent right ear pain.  Hearing is stable.  Subjectively normal.  Audiogram today within normal limits.  Normal tympanometry bilaterally.  Has a history of considerable TMJ.  Also with history of scoliosis.  Has undergone dental evaluation and is status post root canal.  Reports that x-rays there did not reveal sinusitis      Past Medical History:   Diagnosis Date    Antiphospholipid antibody with hypercoagulable state (Multi)     BPPV (benign paroxysmal positional vertigo)     Darrel    Fissure, anal     GERD (gastroesophageal reflux disease)     History of COVID-19 12/2022    History of depression     History of HPV infection     Migraine     Garcia    Protein S deficiency (Multi)     Seasonal allergies     Vitamin D deficiency             Medications:     Current Outpatient Medications:     acetaminophen (Tylenol) 500 mg tablet, Take 2 tablets (1,000 mg) by mouth every 6 hours if needed for mild pain (1 - 3)., Disp: 20 tablet, Rfl: 0    calcium carbonate-vitamin D3 500 mg-5 mcg (200 unit) tablet, Take 1 tablet by mouth once daily., Disp: , Rfl:     cetirizine (ZyrTEC) 10 mg tablet, Take 1 tablet (10 mg) by mouth once daily., Disp: , Rfl:     famotidine (Pepcid) 10 mg tablet, Take 1 tablet (10 mg) by mouth once daily as needed for heartburn., Disp: , Rfl:     fluticasone (Flonase) 50 mcg/actuation nasal spray, Administer 1 spray into each nostril once daily. Shake gently. Before first use, prime pump. After use, clean tip and replace cap., Disp: , Rfl:     Linzess 72 mcg capsule, Take 1 capsule (72 mcg) by mouth once daily in the morning. Take before meals., Disp: , Rfl:     azelastine (Astelin) 137 mcg (0.1 %) nasal spray, Use 2 sprays in each nostril twice daily, Disp: 30 mL, Rfl: 11    ferrous sulfate 325 (65 Fe) MG EC tablet, Take 65 mg by mouth 3 times a day with meals. Do not crush, chew, or split. (Patient not taking: Reported on 2/19/2025), Disp: ,  "Rfl:      Allergies:  Allergies   Allergen Reactions    Dilaudid [Hydromorphone] Hives and Nausea/vomiting    Pseudoephedrine Hcl Other and Unknown     Fast heart rate    Phenytoin Other    Buspirone Hcl Headache    Escitalopram GI Upset    Sertraline Nausea And Vomiting    Venlafaxine Unknown        Physical Exam:  Last Recorded Vitals  Temperature 36.6 °C (97.8 °F), height 1.6 m (5' 3\"), weight 78.5 kg (173 lb).  General:     General appearance: Well-developed, well-nourished in no acute distress.       Voice:  normal       Head/face: Normal appearance; nontender to palpation     Facial nerve function: Normal and symmetric bilaterally.    Oral/oropharynx:     Oral vestibule: Normal labial and gingival mucosa     Tongue/floor of mouth: Normal without lesion     Oropharynx: Clear.  No lesions present of the hard/soft palate, posterior pharynx    Neck:     Neck: Normal appearance, trachea midline     Salivary glands: Normal to palpation bilaterally     Lymph nodes: No cervical lymphadenopathy to palpation     Thyroid: No thyromegaly.  No palpable nodules     Range of motion: Normal    Neurological:     Cortical functions: Alert and oriented x3, appropriate affect       Larynx/hypopharynx:     Laryngeal findings: Mirror exam inadequate or limited secondary to enlarged base of tongue and/or excessive gagging.  Flexible laryngoscopy performed secondary to inadequate mirror examination.  Verbal informed consent the flexible laryngoscope was passed through the nasal cavity.  Normal epiglottis, aryepiglottic folds, arytenoids, false vocal cords, true vocal cords.  Normal vocal cord mobility bilaterally was identified.  No mucosal masses or lesions.    Nasopharynx:     Nasopharynx: Normal    Ear:     Ear canal: Normal bilaterally     Tympanic membrane: Intact and mobile bilaterally     Pinna: Normal bilaterally     Hearing:  Gross hearing assessment normal by voice    Nose:     Visualized using: Flexible nasal endoscopy " utilized secondary to inadequate anterior rhinoscopy  Nasopharynx: Inadequate mirror examination as above, endoscopy demonstrates normal nasopharynx without obstruction or mass       Nasal dorsum: Nontraumatic midline appearance     Septum: Patient     Inferior turbinates: Normally sized     Mucosa: Bilateral, pink, normal appearing.  No pus or polyps      ASSESSMENT/PLAN:  No evidence of nasal, sinus, ear disease on physical examination today.  Suspect that her intermittent discomfort is musculoskeletal and probably related to TMJ and possible cervical spine issues.  I have recommended that she add azelastine to her nasal steroid for benefit this may perhaps provide.  Recommend allergy testing.  She was given the name of an oral surgery colleague to further discuss her TMJ options as she has exhausted conservative measures.  Recheck after testing and giving the scribed time to work.  Sooner as needed with any acute change        Ervin Rojo MD

## 2025-03-06 ENCOUNTER — APPOINTMENT (OUTPATIENT)
Dept: OTOLARYNGOLOGY | Facility: CLINIC | Age: 35
End: 2025-03-06
Payer: COMMERCIAL

## 2025-03-17 ENCOUNTER — APPOINTMENT (OUTPATIENT)
Dept: OTOLARYNGOLOGY | Facility: CLINIC | Age: 35
End: 2025-03-17
Payer: COMMERCIAL

## 2025-03-25 ENCOUNTER — OFFICE VISIT (OUTPATIENT)
Dept: OBSTETRICS AND GYNECOLOGY | Facility: CLINIC | Age: 35
End: 2025-03-25
Payer: COMMERCIAL

## 2025-03-25 VITALS
SYSTOLIC BLOOD PRESSURE: 118 MMHG | BODY MASS INDEX: 31.01 KG/M2 | HEIGHT: 63 IN | DIASTOLIC BLOOD PRESSURE: 68 MMHG | WEIGHT: 175 LBS

## 2025-03-25 DIAGNOSIS — Z01.419 ENCOUNTER FOR ANNUAL ROUTINE GYNECOLOGICAL EXAMINATION: Primary | ICD-10-CM

## 2025-03-25 PROCEDURE — 1036F TOBACCO NON-USER: CPT

## 2025-03-25 PROCEDURE — 99395 PREV VISIT EST AGE 18-39: CPT

## 2025-03-25 PROCEDURE — 3008F BODY MASS INDEX DOCD: CPT

## 2025-03-25 ASSESSMENT — ENCOUNTER SYMPTOMS
ABDOMINAL PAIN: 0
NAUSEA: 0
OCCASIONAL FEELINGS OF UNSTEADINESS: 0
FATIGUE: 0
SHORTNESS OF BREATH: 0
FEVER: 0
DYSURIA: 0
HEADACHES: 0
LOSS OF SENSATION IN FEET: 0
DIZZINESS: 0
COLOR CHANGE: 0
UNEXPECTED WEIGHT CHANGE: 0
VOMITING: 0
COUGH: 0
CHILLS: 0
DEPRESSION: 0

## 2025-03-25 ASSESSMENT — PATIENT HEALTH QUESTIONNAIRE - PHQ9
1. LITTLE INTEREST OR PLEASURE IN DOING THINGS: NOT AT ALL
2. FEELING DOWN, DEPRESSED OR HOPELESS: NOT AT ALL
SUM OF ALL RESPONSES TO PHQ9 QUESTIONS 1 & 2: 0

## 2025-03-25 ASSESSMENT — LIFESTYLE VARIABLES
HOW MANY STANDARD DRINKS CONTAINING ALCOHOL DO YOU HAVE ON A TYPICAL DAY: 1 OR 2
HOW OFTEN DO YOU HAVE A DRINK CONTAINING ALCOHOL: MONTHLY OR LESS
HOW OFTEN DO YOU HAVE SIX OR MORE DRINKS ON ONE OCCASION: NEVER
SKIP TO QUESTIONS 9-10: 1
AUDIT-C TOTAL SCORE: 1

## 2025-03-25 ASSESSMENT — PAIN SCALES - GENERAL: PAINLEVEL_OUTOF10: 0-NO PAIN

## 2025-03-25 NOTE — PROGRESS NOTES
Subjective   Beatriz Lopez is a 34 y.o. female who is here for a routine GYN exam.  I last saw her 2023.    -Menstrual cycles have been irregular as of more recent, last menstrual cycle was 1 week early, 2 months ago her cycle was 10 days late; she does feel this is related to stress so as she has been under high levels of stress, her dad has terminal cancer and is currently in hospice care, she is caring for him.  She feels on average her cycle length is about 30 to 32 days.    -She did see Dr. Denis 2024 for annual GYN exam as it was closer to her office/work; Pap was normal; she states she had a in office ultrasound due to pelvic pain she was experiencing (also had GI evaluation) and was told she has significant scar tissue (hx of 2 C-sections) and would potentially benefit from hysterectomy?  No further follow-up was completed after that.  She did briefly trial 2 months of birth control pills which she did not like, did not like how she felt on them, fast heart rate on them.  She has a history of clotting issues with her pregnancies, history of hypercoagulable state/Protein S deficiency.  She does not desire any future pregnancies.      -Denies breast or vaginal concerns today.    Complaints:   see hpi  Periods: irregular recently   Dysmenorrhea:  none    Current contraception: condoms  History of abnormal Pap smear: yes   - 10/2021 ASCUS, pos HPV other; 10/2022 neg/neg HPV, 2024 neg/neg HPV  History of abnormal mammogram: no      OB History          4    Para   3    Term   3            AB   1    Living   2         SAB   1    IAB        Ectopic        Multiple        Live Births   2                  Review of Systems   Constitutional:  Negative for chills, fatigue, fever and unexpected weight change.   Respiratory:  Negative for cough and shortness of breath.    Gastrointestinal:  Negative for abdominal pain, nausea and vomiting.   Genitourinary:  Negative for dyspareunia, dysuria,  "pelvic pain and vaginal discharge.   Skin:  Negative for color change and rash.   Neurological:  Negative for dizziness and headaches.       Objective   /68   Ht 1.6 m (5' 3\")   Wt 79.4 kg (175 lb)   LMP 03/16/2025   BMI 31.00 kg/m²        General:   Alert and oriented, in no acute distress   Neck: Supple. No visible thyromegaly.    Breast/Axilla: Normal to palpation bilaterally without masses, skin changes, or nipple discharge.    Abdomen: Soft, non-tender, without masses or organomegaly   Vulva: Normal architecture without erythema, masses, or lesions.    Vagina: Normal mucosa without lesions, masses, or atrophy. No abnormal vaginal discharge.    Cervix: Normal without masses, lesions, or signs of cervicitis   Uterus: Normal, mobile, non-enlarged uterus   Adnexa: Normal without masses or lesions   Pelvic Floor Normal    Psych Normal affect. Normal mood.      Assessment/Plan   Diagnoses and all orders for this visit:  Encounter for annual routine gynecological examination   - UTD on pap smear, next due 5/2027.   - Continue monitoring and tracking cycles for now.  She will call office if she is skipping cycles or frequently bleeding.  Will obtain records from her visit with Dr. Denis 5/2024 and 6/2024.   - Emotional support given.     Es Darnell PA-C   "

## 2025-04-25 ENCOUNTER — TELEPHONE (OUTPATIENT)
Dept: PRIMARY CARE | Facility: CLINIC | Age: 35
End: 2025-04-25
Payer: COMMERCIAL

## 2025-04-25 NOTE — TELEPHONE ENCOUNTER
She is asking if you will order MRI of her liver to follow up on the problem that she is having and also has lymph nodes swollen on the sides of her neck.  She is also asking if you can order labs for her.  She had seen Dr Floyd but she was told she is no longer with the practice and she called another GI and can't get in for several months.  She is concerned because her dad just passed away from cancer

## 2025-04-29 DIAGNOSIS — D37.6 NEOPLASM OF UNCERTAIN BEHAVIOR OF LIVER: Primary | ICD-10-CM

## 2025-05-17 ENCOUNTER — APPOINTMENT (OUTPATIENT)
Dept: RADIOLOGY | Facility: HOSPITAL | Age: 35
End: 2025-05-17
Payer: COMMERCIAL

## 2025-05-22 ENCOUNTER — HOSPITAL ENCOUNTER (OUTPATIENT)
Dept: RADIOLOGY | Facility: HOSPITAL | Age: 35
End: 2025-05-22
Payer: COMMERCIAL

## 2025-05-29 ENCOUNTER — HOSPITAL ENCOUNTER (OUTPATIENT)
Dept: RADIOLOGY | Facility: HOSPITAL | Age: 35
End: 2025-05-29
Payer: COMMERCIAL

## 2025-05-31 ENCOUNTER — HOSPITAL ENCOUNTER (OUTPATIENT)
Dept: RADIOLOGY | Facility: HOSPITAL | Age: 35
Discharge: HOME | End: 2025-05-31
Payer: COMMERCIAL

## 2025-05-31 DIAGNOSIS — D37.6 NEOPLASM OF UNCERTAIN BEHAVIOR OF LIVER: ICD-10-CM

## 2025-05-31 DIAGNOSIS — M54.12 CERVICAL RADICULOPATHY: ICD-10-CM

## 2025-05-31 PROCEDURE — 74183 MRI ABD W/O CNTR FLWD CNTR: CPT

## 2025-05-31 PROCEDURE — 2550000001 HC RX 255 CONTRASTS

## 2025-05-31 PROCEDURE — 74183 MRI ABD W/O CNTR FLWD CNTR: CPT | Performed by: STUDENT IN AN ORGANIZED HEALTH CARE EDUCATION/TRAINING PROGRAM

## 2025-05-31 PROCEDURE — 72141 MRI NECK SPINE W/O DYE: CPT | Performed by: STUDENT IN AN ORGANIZED HEALTH CARE EDUCATION/TRAINING PROGRAM

## 2025-05-31 PROCEDURE — A9575 INJ GADOTERATE MEGLUMI 0.1ML: HCPCS

## 2025-05-31 PROCEDURE — 72141 MRI NECK SPINE W/O DYE: CPT

## 2025-05-31 RX ORDER — GADOTERATE MEGLUMINE 376.9 MG/ML
16 INJECTION INTRAVENOUS
Status: COMPLETED | OUTPATIENT
Start: 2025-05-31 | End: 2025-05-31

## 2025-05-31 RX ADMIN — GADOTERATE MEGLUMINE 16 ML: 376.9 INJECTION INTRAVENOUS at 13:26

## 2025-06-05 ENCOUNTER — APPOINTMENT (OUTPATIENT)
Dept: RADIOLOGY | Facility: HOSPITAL | Age: 35
End: 2025-06-05
Payer: COMMERCIAL

## 2025-06-10 ENCOUNTER — APPOINTMENT (OUTPATIENT)
Dept: RADIOLOGY | Facility: HOSPITAL | Age: 35
End: 2025-06-10
Payer: COMMERCIAL

## 2025-07-08 ENCOUNTER — TELEPHONE (OUTPATIENT)
Dept: OBSTETRICS AND GYNECOLOGY | Facility: CLINIC | Age: 35
End: 2025-07-08
Payer: COMMERCIAL

## 2025-07-08 NOTE — TELEPHONE ENCOUNTER
Est pt calling complaining of hair loss(bald spots) and heavy bleeding with clots for several months. Patient requesting appt after 230pm. Appt scheduled. Patient advised to call office if symptoms get worse or change.

## 2025-07-14 ENCOUNTER — EVALUATION (OUTPATIENT)
Dept: PHYSICAL THERAPY | Facility: CLINIC | Age: 35
End: 2025-07-14
Payer: COMMERCIAL

## 2025-07-14 DIAGNOSIS — M54.12 BRACHIAL NEURITIS: ICD-10-CM

## 2025-07-14 PROCEDURE — 97161 PT EVAL LOW COMPLEX 20 MIN: CPT | Mod: GP | Performed by: PHYSICAL THERAPIST

## 2025-07-14 PROCEDURE — 97140 MANUAL THERAPY 1/> REGIONS: CPT | Mod: GP | Performed by: PHYSICAL THERAPIST

## 2025-07-14 NOTE — PROGRESS NOTES
"Physical Therapy    Physical Therapy Evaluation and Treatment    Patient Name: Beatriz Lopez  MRN: [id number]  Encounter Date: 7/14/2025     Time Calculation  Start Time: 1535  Stop Time: 1620  Time Calculation (min): 45 min  Current Problem:   1. Brachial neuritis  Referral to Physical Therapy    Follow Up In Physical Therapy        Relevant Past Medical History: 20 years ago, fell off of a horse and sustained right shoulder injury.     Medications: Aleve, Ibuprofen, Tylenol  Allergies:   Dilaudid [Hydromorphone]             Pseudoephedrine Hcl          Fast heart rate   Phenytoin             Buspirone Hcl             Escitalopram             Sertraline             Venlafaxine            Precautions:   STEADI Fall Risk: 0 (score of 4+ indicates fall risk)     SUBJECTIVE:   The patient is a 34 year old female with complaints of neck pain, migraines,    Notes right UE neuro symptoms.  Feels like her right UE is weak.    Imaging:   MRI  IMPRESSION:  Degenerative changes of the cervical spine present fully at C4-C5 and  C5-C6 where disc protrusions abut and slightly flatten the ventral  cord surface resulting in mild canal stenosis. No evidence of cord  signal abnormality.      No significant neural foraminal stenosis.     Pain:   Current: 2/10  Lowest: 0/10  Highest: 10/10  Location: Neck pain,   Description: Tingling  Aggravating Factors: Certain movements  Relieving Factors: Medications  Sleep Pattern: Pt notes disturbed sleep  Previous Interventions: Chiropractic      Red flags:   Red flags   Hx of CA No   Pacemaker/ Electronic Implant No   Saddle Anesthesia No   Bowel/Bladder Changes No   Sudden Weakness No   Recent Falls (within last 6mo) No       Hand Dominance: Right  Occupation:   Hobbies: Yoga  Home Living: Children 3 and 8 year old  Current Level of Function: IND  Prior Level of Function: IND    Patient/Family Goal: \"No pain\"    Outcome Measures:   Other Measures  Disability of Arm Shoulder " Hand (DASH): 26    OBJECTIVE:    Cognition: Alert and oriented  Posture: Roiunded Shoulders, FH   Functional Mobility: ind  Palpation: Tender at right cervical paraspinals  AROM  Flexion 52  Extension 42  Rotation Right 25 with pain    Left 70      (II)  Right  40, 40  Left  60, 55    HHD  Shoulder Flexion   Right 22.0#  Left  36.6#    (+) Compression  (-) Distraction    Treatments:  Therapeutic Exercise: (7 minutes)  Chin Tuck 2 x 6 reps  Scapular Retractions 2 x 10    Manual Therapy: (10 minutes)  Cervical Traction  Suboccipital Release    OP Education: POC, Goals, HEP     HEP:  Chin Tuck, Scapular Retraction    Response to treatment: Pt noted     ASSESSMENT:   The pt is a 34 year old female right sided neck pain and right UE symptoms.  Pt would benefit from additional PT services to optimize function.    Complexity of Evaluation:   Based on the history including personal factors and/or comorbidities, examination of body systems including body structures and function, activity limitations, and/or participation restrictions, as well as clinical presentation, patient meets criteria for a low complexity evaluation.     PLAN:  OP PT PLAN:  Treatment/Interventions: Aquatic Therapy , Cryotherapy , Dry Needling  , Education/Instruction , Electrical Stimulation , Gait training , Hot Pack , Manual Therapy  , Mechanical Traction  , Neuromuscular re-education , Self care/home management , Taping techniques , Therapeutic activities , Therapeutic exercise  , and Ultrasound   PT Plan: Skilled PT   PT Frequency: 1-2 times per week  Duration: 10 visits  Insurance: 07/14/2025: NO AUTH, 2000 DED-NOT MET, 90% COVERAGE, 20V PT , 3000 OOP-NOT MET, [employer]   Visits Approved:  20 / year  Rehab Potential: Good  Plan of Care Agreement: Patient       Goals:   5 visits (STG)  Pt will be independent with home exercise program with handouts.   2.   Pt will be independent with correction of posture.   3.   Increase Cervical AROM by 10  degrees in order to progress with improving functional mobility.   4.   The pt will be able to complete chin tuck without pain increase.     10 visits (LTG)  Patient to rotate neck to Right / Left in order to look over shoulder when driving to safely switch lanes and back up a care with no pain.   2.   Patient to be able to work  without headaches, neck pain, or dizziness interfering with work.   3.   Patient to demonstrate the use of proper body mechanics and posture when performing their job to decrease symptoms.   4.   Patent to demonstrate proper body mechanics and posture while sleeping to reduce symptoms.

## 2025-07-24 ENCOUNTER — TREATMENT (OUTPATIENT)
Dept: PHYSICAL THERAPY | Facility: CLINIC | Age: 35
End: 2025-07-24
Payer: COMMERCIAL

## 2025-07-24 DIAGNOSIS — M54.12 BRACHIAL NEURITIS: ICD-10-CM

## 2025-07-24 PROCEDURE — 97110 THERAPEUTIC EXERCISES: CPT | Mod: GP | Performed by: PHYSICAL THERAPIST

## 2025-07-24 PROCEDURE — 97140 MANUAL THERAPY 1/> REGIONS: CPT | Mod: GP | Performed by: PHYSICAL THERAPIST

## 2025-07-24 NOTE — PROGRESS NOTES
"Physical Therapy    Physical Therapy Treatment    Patient Name: Beatriz Lopez  MRN: [id number]  Encounter Date: 7/24/2025     Time Calculation  Start Time: 1546  Stop Time: 1637  Time Calculation (min): 51 min    Visit #: 2  out of 10  Insurance: 07/14/2025: NO AUTH, 2000 DED-NOT MET, 90% COVERAGE, 20V PT , 3000 OOP-NOT MET, [employer]   Evaluation date: 7/14/25    Current Problem:   1. Brachial neuritis  Follow Up In Physical Therapy        SUBJECTIVE:   The patient states that she noted feeling better after first session.     Precautions: STEADI Fall Risk: 0 (score of 4+ indicates fall risk)      Pain:   Start of session: 4/10     OBJECTIVE:    Cervical Rotation R 45  L 65    Treatments:  Therapeutic Exercise: (20 minutes)  - Seated pulleys into shoulder flexion x 15 each.    - Standing UT Stretch with strap 3 x 15 seconds each.    - Cervical Assisted Rotation with Towel    - Rows with chin tuck with level 1 band    Manual Therapy: (25 minutes)  Cervical Traction  Suboccipital Release    Moist Heat to full back and cervical during manual.      HEP:  Access Code: [email or web address]  URL: [email or web address]  Date: 07/24/2025  Prepared by:   Exercises  - Seated Assisted Cervical Rotation with Towel  - 2 x daily - 7 x weekly - 2 sets - 10 reps - 3 hold  - Seated Cervical Sidebending Stretch  - 2 x daily - 7 x weekly - 3 sets - 15 hold    ASSESSMENT:   The pt demonstrates better AROM today versus the initial session.  Did well with progression of activity and home program.    Post session pain: No significant change; \"Sore\" after session.     PLAN:  OP PT PLAN:  Treatment/Interventions: Aquatic Therapy , Cryotherapy , Dry Needling  , Education/Instruction , Electrical Stimulation , Gait training , Hot Pack , Manual Therapy  , Mechanical Traction  , Neuromuscular re-education , Self care/home management , Taping techniques , Therapeutic activities , Therapeutic exercise  , and Ultrasound   PT Plan: Skilled PT   PT " Frequency: 1-2 times per week  Duration: 10 visits  Insurance: 07/14/2025: NO AUTH, 2000 DED-NOT MET, 90% COVERAGE, 20V PT , 3000 OOP-NOT MET, [employer]   Visits Approved:  20 / year  Rehab Potential: Good  Plan of Care Agreement: Patient       Goals:   5 visits (STG)  Pt will be independent with home exercise program with handouts.   2.   Pt will be independent with correction of posture.   3.   Increase Cervical AROM by 10 degrees in order to progress with improving functional mobility.   4.   The pt will be able to complete chin tuck without pain increase.      10 visits (LTG)  Patient to rotate neck to Right / Left in order to look over shoulder when driving to safely switch lanes and back up a care with no pain.   2.   Patient to be able to work  without headaches, neck pain, or dizziness interfering with work.   3.   Patient to demonstrate the use of proper body mechanics and posture when performing their job to decrease symptoms.   4.   Patent to demonstrate proper body mechanics and posture while sleeping to reduce symptoms.

## 2025-08-04 ENCOUNTER — APPOINTMENT (OUTPATIENT)
Dept: OBSTETRICS AND GYNECOLOGY | Facility: CLINIC | Age: 35
End: 2025-08-04
Payer: COMMERCIAL

## 2025-10-16 ENCOUNTER — APPOINTMENT (OUTPATIENT)
Dept: OBSTETRICS AND GYNECOLOGY | Facility: CLINIC | Age: 35
End: 2025-10-16
Payer: COMMERCIAL

## (undated) DEVICE — Device

## (undated) DEVICE — ADHESIVE, SKIN, MASTISOL, 2/3 CC VIAL

## (undated) DEVICE — APPLICATOR, CHLORAPREP, W/ORANGE TINT, 26ML

## (undated) DEVICE — GOWN, SURGICAL, SIRUS, NON REINFORCED, LARGE

## (undated) DEVICE — TROCAR, KII OPTICAL BLADELESS 5MM Z THREAD 100MM LNGTH

## (undated) DEVICE — RETRIEVAL SYSTEM, MONARCH, 10MM DISP ENDOSCOPIC

## (undated) DEVICE — IRRIGATOR, WOUND, HYDRO SURG PLUS, W/O TIP, DISP

## (undated) DEVICE — CLIP, ENDO APPLIER LIGAMAX 5MM

## (undated) DEVICE — SLEEVE, KII, Z-THREAD, 5X100CM

## (undated) DEVICE — DRESSING, TRANSPARENT, TEGADERM, 2-3/8 X 2-3/4 IN

## (undated) DEVICE — GLOVE, SURGICAL, PROTEXIS PI , 6.5, PF, LF

## (undated) DEVICE — SOLUTION, IRRIGATION, X RX SODIUM CHL 0.9%, 1000ML BTL

## (undated) DEVICE — GLOVE, SURGICAL, PROTEXIS PI BLUE W/NEUTHERA, 6.5, PF, LF

## (undated) DEVICE — STRIP, SKIN CLOSURE, STERI STRIP, REINFORCED, 0.5 X 4 IN

## (undated) DEVICE — CARE KIT, LAPAROSCOPIC, ADVANCED

## (undated) DEVICE — GLOVE, SURGICAL, PROTEXIS PI , 6.0, PF, LF

## (undated) DEVICE — SUTURE, MONOCRYL, 4-0, 18 IN, PS2, UNDYED

## (undated) DEVICE — TROCAR SYSTEM, BALLOON, KII GELPORT, 12 X 100MM